# Patient Record
Sex: FEMALE | Race: WHITE | NOT HISPANIC OR LATINO | Employment: FULL TIME | URBAN - METROPOLITAN AREA
[De-identification: names, ages, dates, MRNs, and addresses within clinical notes are randomized per-mention and may not be internally consistent; named-entity substitution may affect disease eponyms.]

---

## 2017-01-18 ENCOUNTER — OFFICE VISIT (OUTPATIENT)
Dept: FAMILY MEDICINE | Facility: CLINIC | Age: 22
End: 2017-01-18
Payer: COMMERCIAL

## 2017-01-18 VITALS
HEART RATE: 64 BPM | RESPIRATION RATE: 20 BRPM | OXYGEN SATURATION: 98 % | BODY MASS INDEX: 21.47 KG/M2 | TEMPERATURE: 97.5 F | WEIGHT: 150 LBS | DIASTOLIC BLOOD PRESSURE: 70 MMHG | SYSTOLIC BLOOD PRESSURE: 110 MMHG | HEIGHT: 70 IN

## 2017-01-18 DIAGNOSIS — R07.0 THROAT PAIN: Primary | ICD-10-CM

## 2017-01-18 LAB
DEPRECATED S PYO AG THROAT QL EIA: NORMAL
MICRO REPORT STATUS: NORMAL
SPECIMEN SOURCE: NORMAL

## 2017-01-18 PROCEDURE — 99212 OFFICE O/P EST SF 10 MIN: CPT | Performed by: INTERNAL MEDICINE

## 2017-01-18 PROCEDURE — 87081 CULTURE SCREEN ONLY: CPT | Performed by: INTERNAL MEDICINE

## 2017-01-18 PROCEDURE — 87880 STREP A ASSAY W/OPTIC: CPT | Performed by: INTERNAL MEDICINE

## 2017-01-18 NOTE — PROGRESS NOTES
"  SUBJECTIVE:                                                    Tiffany Munoz is a 21 year old female who presents to clinic today for the following health issues:    RESPIRATORY SYMPTOMS      Duration: 01/16/17    Description  nasal congestion,sore throat, facial pain/pressure, cough, fever, chills, ear pain both, headache, fatigue/malaise and conjunctival irritation    Severity: moderate    Accompanying signs and symptoms: None    History (predisposing factors):  none    Precipitating or alleviating factors: None    Therapies tried and outcome:  Rest, Fluids and OTC NSAID            Works with kids; several with strep.              Temp 99+ this AM.      Problem list and histories reviewed & adjusted, as indicated.  Additional history: as documented    No current outpatient prescriptions on file.     Allergies   Allergen Reactions     Dust Mites      BP Readings from Last 3 Encounters:   01/18/17 110/70   12/14/16 102/60   08/23/16 100/50    Wt Readings from Last 3 Encounters:   01/18/17 150 lb (68.04 kg)   12/14/16 156 lb (70.761 kg)   08/23/16 143 lb (64.864 kg)                    ROS: See above      OBJECTIVE:                                                    /70 mmHg  Pulse 64  Temp(Src) 97.5  F (36.4  C) (Tympanic)  Resp 20  Ht 5' 10\" (1.778 m)  Wt 150 lb (68.04 kg)  BMI 21.52 kg/m2  SpO2 98%  LMP  (LMP Unknown)  Breastfeeding? No  Body mass index is 21.52 kg/(m^2).  GENERAL APPEARANCE: alert and no distress  HENT: Minimal erythema posterior oropharynx.  Ear canals and TMs are normal. No sinus tenderness.  NECK: cervical adenopathy , quite minimal    Diagnostic test results:  Results for orders placed or performed in visit on 01/18/17 (from the past 24 hour(s))   Strep, Rapid Screen   Result Value Ref Range    Specimen Description Throat     Rapid Strep A Screen       NEGATIVE: No Group A streptococcal antigen detected by immunoassay, await   culture report.      Micro Report Status " FINAL 01/18/2017         ASSESSMENT/PLAN:                                                        ICD-10-CM    1. Throat pain R07.0 Strep, Rapid Screen       Symptoms are consistent with a viral syndrome. Supportive therapy discussed.  Follow up with Provider - margaret Mendoza MD  [facility]

## 2017-01-18 NOTE — NURSING NOTE
"Chief Complaint   Patient presents with     URI     /70 mmHg  Pulse 64  Temp(Src) 97.5  F (36.4  C) (Tympanic)  Resp 20  Ht 5' 10\" (1.778 m)  Wt 150 lb (68.04 kg)  BMI 21.52 kg/m2  SpO2 98%  LMP  (LMP Unknown)  Breastfeeding? No Estimated body mass index is 21.52 kg/(m^2) as calculated from the following:    Height as of this encounter: 5' 10\" (1.778 m).    Weight as of this encounter: 150 lb (68.04 kg).  BP completed using cuff size: denton Herrera CMA    Health Maintenance Due   Topic Date Due     HPV IMMUNIZATION (2 of 3 - Female 3 Dose Series) 06/27/2007     NGA QUESTIONNAIRE 1 MONTH  06/18/2016     INFLUENZA VACCINE (SYSTEM ASSIGNED)  09/01/2016     Health Maintenance reviewed at today's visit patient asked to schedule/complete:   Immunizations:  Patient agrees to schedule      "

## 2017-01-20 LAB
BACTERIA SPEC CULT: NORMAL
MICRO REPORT STATUS: NORMAL
SPECIMEN SOURCE: NORMAL

## 2017-02-28 ENCOUNTER — TELEPHONE (OUTPATIENT)
Dept: FAMILY MEDICINE | Facility: CLINIC | Age: 22
End: 2017-02-28

## 2017-02-28 ENCOUNTER — OFFICE VISIT (OUTPATIENT)
Dept: FAMILY MEDICINE | Facility: CLINIC | Age: 22
End: 2017-02-28
Payer: COMMERCIAL

## 2017-02-28 ENCOUNTER — RADIANT APPOINTMENT (OUTPATIENT)
Dept: GENERAL RADIOLOGY | Facility: CLINIC | Age: 22
End: 2017-02-28
Attending: PHYSICIAN ASSISTANT
Payer: COMMERCIAL

## 2017-02-28 VITALS
SYSTOLIC BLOOD PRESSURE: 118 MMHG | DIASTOLIC BLOOD PRESSURE: 68 MMHG | HEIGHT: 70 IN | WEIGHT: 154 LBS | BODY MASS INDEX: 22.05 KG/M2 | OXYGEN SATURATION: 96 % | TEMPERATURE: 99 F | HEART RATE: 64 BPM | RESPIRATION RATE: 14 BRPM

## 2017-02-28 DIAGNOSIS — M25.561 PAIN OF RIGHT PATELLA: ICD-10-CM

## 2017-02-28 DIAGNOSIS — S89.91XA RIGHT KNEE INJURY, INITIAL ENCOUNTER: Primary | ICD-10-CM

## 2017-02-28 DIAGNOSIS — S89.91XA RIGHT KNEE INJURY, INITIAL ENCOUNTER: ICD-10-CM

## 2017-02-28 PROCEDURE — 73562 X-RAY EXAM OF KNEE 3: CPT | Mod: RT

## 2017-02-28 PROCEDURE — 99213 OFFICE O/P EST LOW 20 MIN: CPT | Performed by: PHYSICIAN ASSISTANT

## 2017-02-28 ASSESSMENT — ANXIETY QUESTIONNAIRES
3. WORRYING TOO MUCH ABOUT DIFFERENT THINGS: NEARLY EVERY DAY
6. BECOMING EASILY ANNOYED OR IRRITABLE: MORE THAN HALF THE DAYS
GAD7 TOTAL SCORE: 18
IF YOU CHECKED OFF ANY PROBLEMS ON THIS QUESTIONNAIRE, HOW DIFFICULT HAVE THESE PROBLEMS MADE IT FOR YOU TO DO YOUR WORK, TAKE CARE OF THINGS AT HOME, OR GET ALONG WITH OTHER PEOPLE: VERY DIFFICULT
1. FEELING NERVOUS, ANXIOUS, OR ON EDGE: NEARLY EVERY DAY
5. BEING SO RESTLESS THAT IT IS HARD TO SIT STILL: MORE THAN HALF THE DAYS
7. FEELING AFRAID AS IF SOMETHING AWFUL MIGHT HAPPEN: NEARLY EVERY DAY
2. NOT BEING ABLE TO STOP OR CONTROL WORRYING: MORE THAN HALF THE DAYS

## 2017-02-28 ASSESSMENT — PATIENT HEALTH QUESTIONNAIRE - PHQ9: 5. POOR APPETITE OR OVEREATING: NEARLY EVERY DAY

## 2017-02-28 NOTE — MR AVS SNAPSHOT
"              After Visit Summary   2/28/2017    Tiffany Munoz    MRN: [id number]           Patient Information     Date Of Birth          1995        Visit Information        Provider Department      2/28/2017 1:50 PM Siegler, Nicole Joy, PA-C [facility]        Today's Diagnoses     Right knee injury, initial encounter    -  1    Pain of right patella           Follow-ups after your visit        Who to contact     If you have questions or need follow up information about today's clinic visit or your schedule please contact [facility] directly at [phone].  Normal or non-critical lab and imaging results will be communicated to you by PneumRxhart, letter or phone within 4 business days after the clinic has received the results. If you do not hear from us within 7 days, please contact the clinic through PneumRxhart or phone. If you have a critical or abnormal lab result, we will notify you by phone as soon as possible.  Submit refill requests through Silenseed or call your pharmacy and they will forward the refill request to us. Please allow 3 business days for your refill to be completed.          Additional Information About Your Visit        MyChart Information     Silenseed gives you secure access to your electronic health record. If you see a primary care provider, you can also send messages to your care team and make appointments. If you have questions, please call your primary care clinic.  If you do not have a primary care provider, please call [phone] and they will assist you.        Care EveryWhere ID     This is your Care EveryWhere ID. This could be used by other organizations to access your [facility] medical records  [id number]        Your Vitals Were     Pulse Temperature Respirations Height Pulse Oximetry Breastfeeding?    64 99  F (37.2  C) 14 5' 10\" (1.778 m) 96% No    BMI (Body Mass Index)                   22.1 kg/m2            Blood " Pressure from Last 3 Encounters:   02/28/17 118/68   01/18/17 110/70   12/14/16 102/60    Weight from Last 3 Encounters:   02/28/17 154 lb (69.9 kg)   01/18/17 150 lb (68 kg)   12/14/16 156 lb (70.8 kg)               Primary Care Provider Office Phone # Fax #    Nicole Joy Siegler, PA-C [phone]       [facility] [address] [address]  [facility] [address]        Thank you!     Thank you for choosing [facility]  for your care. Our goal is always to provide you with excellent care. Hearing back from our patients is one way we can continue to improve our services. Please take a few minutes to complete the written survey that you may receive in the mail after your visit with us. Thank you!             Your Updated Medication List - Protect others around you: Learn how to safely use, store and throw away your medicines at www.disposemymeds.org.      Notice  As of 2/28/2017 11:59 PM    You have not been prescribed any medications.

## 2017-02-28 NOTE — PROGRESS NOTES
SUBJECTIVE:                                                    Tiffany Munoz is a 21 year old female who presents to clinic today for the following health issues:    Musculoskeletal problem/pain      Duration: Fell on Saturday with residual right knee pain    Description  Location: Right Knee    Intensity:  7/10    Accompanying signs and symptoms: swelling and discoloration of knee    History  Previous similar problem: no   Previous evaluation:  none    Precipitating or alleviating factors:  Trauma or overuse: YES  Aggravating factors include: standing, walking and driving    Therapies tried and outcome: ice and NSAID -      As above; Tiffany fell on Saturday while outside and landed on the right knee and knee cap; she feels continued pain in that area and noted swelling with some bruising as well. She has pain with bending the knee more so than standing straight up but has pain with standing as well. No numbness or tingling, no calf pain or swelling. No previous hx of fracture or dislocation of the right knee.     Problem list and histories reviewed & adjusted, as indicated.  Additional history: as documented    Patient Active Problem List   Diagnosis     Malaise and fatigue     Contraception     Mild major depression (H)     Anxiety     Menorrhagia     History reviewed. No pertinent past surgical history.    Social History   Substance Use Topics     Smoking status: Never Smoker     Smokeless tobacco: Never Used     Alcohol use No     Family History   Problem Relation Age of Onset     CEREBROVASCULAR DISEASE Maternal Grandmother 70     Unknown/Adopted Mother      Unknown/Adopted Father      CANCER No family hx of      HEART DISEASE No family hx of      DIABETES No family hx of      Coronary Artery Disease No family hx of      Hypertension No family hx of      Hyperlipidemia No family hx of      Breast Cancer No family hx of      Colon Cancer No family hx of      Prostate Cancer No family hx of      Other  "Cancer No family hx of      Depression No family hx of      Anxiety Disorder No family hx of      MENTAL ILLNESS No family hx of      Substance Abuse No family hx of      Anesthesia Reaction No family hx of      Asthma No family hx of      OSTEOPOROSIS No family hx of      Genetic Disorder No family hx of      Thyroid Disease No family hx of      Obesity No family hx of          No current outpatient prescriptions on file.       Reviewed and updated as needed this visit by clinical staff  Tobacco  Allergies  Meds  Med Hx  Surg Hx  Fam Hx  Soc Hx      Reviewed and updated as needed this visit by Provider         ROS:  Constitutional, HEENT, cardiovascular, pulmonary, gi and gu systems are negative, except as otherwise noted.    OBJECTIVE:                                                    /68 (BP Location: Left arm, Patient Position: Chair, Cuff Size: Adult Regular)  Pulse 64  Temp 99  F (37.2  C)  Resp 14  Ht 5' 10\" (1.778 m)  Wt 154 lb (69.9 kg)  SpO2 96%  Breastfeeding? No  BMI 22.1 kg/m2  Body mass index is 22.1 kg/(m^2).  GENERAL APPEARANCE: healthy, alert and no distress  ORTHO: Ankle Exam:   Knee: tender to palpation over the proximal tibia and patella and patellar tendon with bruising and mild swelling over that area. No medial or lateral joint line tenderness. No posterior tenderness or distal femur tenderness. Fibular head is non tender. Full ROM of the knee with extension and flexion equally; strength is full. Stable to varus/valgus, anterior/posterior drawer, lachman testing.   Lower leg:normal appearance, normal on palpation, normal gastroc-soleus muscle complex  NEURO: SILT over   VASC digits warm and well perfused    Diagnostic Test Results:  Xray right knee 3 views- negative for fracture or acute injury per my read; pending radiology     ASSESSMENT/PLAN:                                                        ICD-10-CM    1. Right knee injury, initial encounter S89.91XA XR Knee Right 3 " Views   2. Pain of right patella M25.561 XR Knee Right 3 Views     Negative exam and xray for fracture or injury requiring intervention at this time.   Ice, elevate, compress and reduce use for 1-2 weeks; return if not improving as anticipated     Nicole Joy Siegler, PA-C  [facility]

## 2017-02-28 NOTE — NURSING NOTE
"Chief Complaint   Patient presents with     Knee Injury     Right knee, fell on ice Saturday. edema and bruising.       Initial There were no vitals taken for this visit. Estimated body mass index is 21.52 kg/(m^2) as calculated from the following:    Height as of 1/18/17: 5' 10\" (1.778 m).    Weight as of 1/18/17: 150 lb (68 kg).  Medication Reconciliation: complete     Sheree Wright LPN  "

## 2017-02-28 NOTE — TELEPHONE ENCOUNTER
Tiffany Munoz is a 21 year old female who calls with knee bruise and pain.    NURSING ASSESSMENT:  Description: blue/yellow bruise on one knee and a scrape, hurts to walk and put weight on it - but able to walk, no deformity, some mild swelling, painful to bend knee and pain in the back of the knee  Onset/duration: 3 days   Precip. factors:  Patient fell on ice knee down  Associated symptoms: no numbness and no tingling, some tingling on the first day only.   Improves/worsens symptoms: Ice helps   Pain scale (0-10)   8/10  LMP/preg/breast feeding:  NA   Last exam/Treatment:  1/18/17   Allergies:   Allergies   Allergen Reactions     Dust Mites        MEDICATIONS:   Taking medication(s) as prescribed? N/A  Taking over the counter medication(s?) N/A  Any medication side effects? No significant side effects    Any barriers to taking medication(s) as prescribed?  N/A  Medication(s) improving/managing symptoms?  N/A  Medication reconciliation completed: N/A      NURSING PLAN: Nursing advice to patient given     Rest, Ice, & Elevate until seen.     RECOMMENDED DISPOSITION:  See in 4 hours, another person to drive - Yes Appt scheduled for today with PCP.   Will comply with recommendation: Yes  If further questions/concerns or if symptoms do not improve, worsen or new symptoms develop, call your PCP or [facility] as soon as possible.      Guideline used:  Telephone Triage Protocols for Nurses, Fourth Edition, Whit Stiles RN

## 2017-03-01 ASSESSMENT — ANXIETY QUESTIONNAIRES: GAD7 TOTAL SCORE: 18

## 2017-03-06 ENCOUNTER — OFFICE VISIT (OUTPATIENT)
Dept: FAMILY MEDICINE | Facility: CLINIC | Age: 22
End: 2017-03-06
Payer: COMMERCIAL

## 2017-03-06 VITALS
RESPIRATION RATE: 16 BRPM | TEMPERATURE: 99.6 F | SYSTOLIC BLOOD PRESSURE: 110 MMHG | DIASTOLIC BLOOD PRESSURE: 68 MMHG | WEIGHT: 152 LBS | HEART RATE: 94 BPM | BODY MASS INDEX: 21.76 KG/M2 | HEIGHT: 70 IN | OXYGEN SATURATION: 97 %

## 2017-03-06 DIAGNOSIS — R07.0 THROAT PAIN: Primary | ICD-10-CM

## 2017-03-06 LAB
DEPRECATED S PYO AG THROAT QL EIA: ABNORMAL
MICRO REPORT STATUS: ABNORMAL
SPECIMEN SOURCE: ABNORMAL

## 2017-03-06 PROCEDURE — 87880 STREP A ASSAY W/OPTIC: CPT | Performed by: FAMILY MEDICINE

## 2017-03-06 PROCEDURE — 99213 OFFICE O/P EST LOW 20 MIN: CPT | Performed by: FAMILY MEDICINE

## 2017-03-06 RX ORDER — AMOXICILLIN 500 MG/1
500 CAPSULE ORAL 3 TIMES DAILY
Qty: 30 CAPSULE | Refills: 0 | Status: SHIPPED | OUTPATIENT
Start: 2017-03-06 | End: 2017-04-10

## 2017-03-06 NOTE — NURSING NOTE
"Chief Complaint   Patient presents with     URI       Initial /68  Pulse 94  Temp 99.6  F (37.6  C) (Tympanic)  Resp 16  Ht 5' 10\" (1.778 m)  Wt 152 lb (68.9 kg)  SpO2 97%  BMI 21.81 kg/m2 Estimated body mass index is 21.81 kg/(m^2) as calculated from the following:    Height as of this encounter: 5' 10\" (1.778 m).    Weight as of this encounter: 152 lb (68.9 kg).  Medication Reconciliation: complete   .Keely BANKS      "

## 2017-03-06 NOTE — MR AVS SNAPSHOT
After Visit Summary   3/6/2017    Tiffany Munoz    MRN: [id number]           Patient Information     Date Of Birth          1995        Visit Information        Provider Department      3/6/2017 2:00 PM Andrew River MD [facility]        Today's Diagnoses     Throat pain    -  1      Care Instructions    Symptomatic treatment.  Will use saline gargles, tylenol and/or advil. Suck on  lozenges as needed. Push fluids. Salt water nasal spray as needed.        Follow-ups after your visit        Follow-up notes from your care team     Return if symptoms worsen or fail to improve.      Who to contact     If you have questions or need follow up information about today's clinic visit or your schedule please contact [facility] directly at [phone].  Normal or non-critical lab and imaging results will be communicated to you by Nethra Imaginghart, letter or phone within 4 business days after the clinic has received the results. If you do not hear from us within 7 days, please contact the clinic through Nethra Imaginghart or phone. If you have a critical or abnormal lab result, we will notify you by phone as soon as possible.  Submit refill requests through [employer] or call your pharmacy and they will forward the refill request to us. Please allow 3 business days for your refill to be completed.          Additional Information About Your Visit        MyChart Information     [employer]ity gives you secure access to your electronic health record. If you see a primary care provider, you can also send messages to your care team and make appointments. If you have questions, please call your primary care clinic.  If you do not have a primary care provider, please call [phone] and they will assist you.        Care EveryWhere ID     This is your Care EveryWhere ID. This could be used by other organizations to access your [facility] medical records  [id number]        Your  "Vitals Were     Pulse Temperature Respirations Height Pulse Oximetry BMI (Body Mass Index)    94 99.6  F (37.6  C) (Tympanic) 16 5' 10\" (1.778 m) 97% 21.81 kg/m2       Blood Pressure from Last 3 Encounters:   03/06/17 110/68   02/28/17 118/68   01/18/17 110/70    Weight from Last 3 Encounters:   03/06/17 152 lb (68.9 kg)   02/28/17 154 lb (69.9 kg)   01/18/17 150 lb (68 kg)              We Performed the Following     Rapid strep screen          Today's Medication Changes          These changes are accurate as of: 3/6/17  2:54 PM.  If you have any questions, ask your nurse or doctor.               Start taking these medicines.        Dose/Directions    amoxicillin 500 MG capsule   Commonly known as:  AMOXIL   Used for:  Throat pain   Started by:  Andrew River MD        Dose:  500 mg   Take 1 capsule (500 mg) by mouth 3 times daily   Quantity:  30 capsule   Refills:  0            Where to get your medicines      These medications were sent to [id number] - [facility] [address]  [address] [facility] [address]     Phone:  [phone]     amoxicillin 500 MG capsule                Primary Care Provider Office Phone # Fax #    Nicole Joy Siegler, PA-C [phone]       [facility] [address] [address]  [place] [address]        Thank you!     Thank you for choosing [facility]  for your care. Our goal is always to provide you with excellent care. Hearing back from our patients is one way we can continue to improve our services. Please take a few minutes to complete the written survey that you may receive in the mail after your visit with us. Thank you!             Your Updated Medication List - Protect others around you: Learn how to safely use, store and throw away your medicines at www.disposemymeds.org.          This list is accurate as of: 3/6/17  2:54 PM.  Always use your most recent med list.                   Brand " Name Dispense Instructions for use    amoxicillin 500 MG capsule    AMOXIL    30 capsule    Take 1 capsule (500 mg) by mouth 3 times daily

## 2017-03-06 NOTE — LETTER
[facility]  [address]  [address]  [place] [address]  [phone]                                                                                                           Tiffany Munoz  [facility] [facility]   [place] [address]    March 6, 2017      To Whom it Concerns     Tiffany SIDNEY Munoz, was seen today with positive Strep throat test.  She should be home from school/work thru Tuesday 3/7/17                Sincerely,    Andrew River MD

## 2017-03-16 ENCOUNTER — HOSPITAL ENCOUNTER (EMERGENCY)
Facility: CLINIC | Age: 22
Discharge: HOME | End: 2017-03-16
Attending: PHYSICIAN ASSISTANT | Admitting: PHYSICIAN ASSISTANT
Payer: COMMERCIAL

## 2017-03-16 ENCOUNTER — APPOINTMENT (OUTPATIENT)
Dept: ULTRASOUND IMAGING | Facility: CLINIC | Age: 22
End: 2017-03-16
Attending: PHYSICIAN ASSISTANT
Payer: COMMERCIAL

## 2017-03-16 VITALS
BODY MASS INDEX: 21.47 KG/M2 | SYSTOLIC BLOOD PRESSURE: 124 MMHG | OXYGEN SATURATION: 99 % | RESPIRATION RATE: 16 BRPM | TEMPERATURE: 98.4 F | HEIGHT: 70 IN | WEIGHT: 150 LBS | DIASTOLIC BLOOD PRESSURE: 89 MMHG

## 2017-03-16 DIAGNOSIS — R10.32 ABDOMINAL PAIN, LEFT LOWER QUADRANT: ICD-10-CM

## 2017-03-16 LAB
ALBUMIN SERPL-MCNC: 4 G/DL (ref 3.4–5)
ALBUMIN UR-MCNC: NEGATIVE MG/DL
ALP SERPL-CCNC: 88 U/L (ref 40–150)
ALT SERPL W P-5'-P-CCNC: 19 U/L (ref 0–50)
ANION GAP SERPL CALCULATED.3IONS-SCNC: 9 MMOL/L (ref 3–14)
APPEARANCE UR: CLEAR
AST SERPL W P-5'-P-CCNC: 23 U/L (ref 0–45)
BASOPHILS # BLD AUTO: 0 10E9/L (ref 0–0.2)
BASOPHILS NFR BLD AUTO: 0.2 %
BILIRUB SERPL-MCNC: 0.4 MG/DL (ref 0.2–1.3)
BILIRUB UR QL STRIP: NEGATIVE
BUN SERPL-MCNC: 11 MG/DL (ref 7–30)
CALCIUM SERPL-MCNC: 8.7 MG/DL (ref 8.5–10.1)
CHLORIDE SERPL-SCNC: 105 MMOL/L (ref 94–109)
CO2 SERPL-SCNC: 25 MMOL/L (ref 20–32)
COLOR UR AUTO: YELLOW
CREAT SERPL-MCNC: 0.66 MG/DL (ref 0.52–1.04)
DIFFERENTIAL METHOD BLD: NORMAL
EOSINOPHIL # BLD AUTO: 0.1 10E9/L (ref 0–0.7)
EOSINOPHIL NFR BLD AUTO: 0.7 %
ERYTHROCYTE [DISTWIDTH] IN BLOOD BY AUTOMATED COUNT: 12.7 % (ref 10–15)
GFR SERPL CREATININE-BSD FRML MDRD: NORMAL ML/MIN/1.7M2
GLUCOSE SERPL-MCNC: 90 MG/DL (ref 70–99)
GLUCOSE UR STRIP-MCNC: NEGATIVE MG/DL
HCG SERPL QL: NEGATIVE
HCT VFR BLD AUTO: 40.9 % (ref 35–47)
HGB BLD-MCNC: 13.9 G/DL (ref 11.7–15.7)
HGB UR QL STRIP: ABNORMAL
IMM GRANULOCYTES # BLD: 0 10E9/L (ref 0–0.4)
IMM GRANULOCYTES NFR BLD: 0.2 %
KETONES UR STRIP-MCNC: NEGATIVE MG/DL
LEUKOCYTE ESTERASE UR QL STRIP: NEGATIVE
LIPASE SERPL-CCNC: 138 U/L (ref 73–393)
LYMPHOCYTES # BLD AUTO: 2.2 10E9/L (ref 0.8–5.3)
LYMPHOCYTES NFR BLD AUTO: 25.2 %
MCH RBC QN AUTO: 32.6 PG (ref 26.5–33)
MCHC RBC AUTO-ENTMCNC: 34 G/DL (ref 31.5–36.5)
MCV RBC AUTO: 96 FL (ref 78–100)
MONOCYTES # BLD AUTO: 0.8 10E9/L (ref 0–1.3)
MONOCYTES NFR BLD AUTO: 9.7 %
NEUTROPHILS # BLD AUTO: 5.5 10E9/L (ref 1.6–8.3)
NEUTROPHILS NFR BLD AUTO: 64 %
NITRATE UR QL: NEGATIVE
NON-SQ EPI CELLS #/AREA URNS LPF: NORMAL /LPF
NRBC # BLD AUTO: 0 10*3/UL
NRBC BLD AUTO-RTO: 0 /100
PH UR STRIP: 5 PH (ref 5–7)
PLATELET # BLD AUTO: 216 10E9/L (ref 150–450)
POTASSIUM SERPL-SCNC: 3.6 MMOL/L (ref 3.4–5.3)
PROT SERPL-MCNC: 7.6 G/DL (ref 6.8–8.8)
RBC # BLD AUTO: 4.26 10E12/L (ref 3.8–5.2)
RBC #/AREA URNS AUTO: NORMAL /HPF (ref 0–2)
SODIUM SERPL-SCNC: 139 MMOL/L (ref 133–144)
SP GR UR STRIP: 1.01 (ref 1–1.03)
URN SPEC COLLECT METH UR: ABNORMAL
UROBILINOGEN UR STRIP-ACNC: 0.2 EU/DL (ref 0.2–1)
WBC # BLD AUTO: 8.5 10E9/L (ref 4–11)
WBC #/AREA URNS AUTO: NORMAL /HPF (ref 0–2)

## 2017-03-16 PROCEDURE — 84703 CHORIONIC GONADOTROPIN ASSAY: CPT | Performed by: EMERGENCY MEDICINE

## 2017-03-16 PROCEDURE — 99284 EMERGENCY DEPT VISIT MOD MDM: CPT | Mod: 25

## 2017-03-16 PROCEDURE — 80053 COMPREHEN METABOLIC PANEL: CPT | Performed by: EMERGENCY MEDICINE

## 2017-03-16 PROCEDURE — 81001 URINALYSIS AUTO W/SCOPE: CPT | Performed by: EMERGENCY MEDICINE

## 2017-03-16 PROCEDURE — 85025 COMPLETE CBC W/AUTO DIFF WBC: CPT | Performed by: EMERGENCY MEDICINE

## 2017-03-16 PROCEDURE — 83690 ASSAY OF LIPASE: CPT | Performed by: EMERGENCY MEDICINE

## 2017-03-16 PROCEDURE — 93976 VASCULAR STUDY: CPT | Mod: XS

## 2017-03-16 ASSESSMENT — ENCOUNTER SYMPTOMS
NAUSEA: 0
VOMITING: 0
ABDOMINAL PAIN: 1

## 2017-03-16 NOTE — ED AVS SNAPSHOT
Emergency Department    [address] [place] [address]    Phone:  [phone]    Fax:  [phone]                                       Tiffany Munoz   MRN: [id number]    Department:   Emergency Department   Date of Visit:  3/16/2017           After Visit Summary Signature Page     I have received my discharge instructions, and my questions have been answered. I have discussed any challenges I see with this plan with the nurse or doctor.    ..........................................................................................................................................  Patient/Patient Representative Signature      ..........................................................................................................................................  Patient Representative Print Name and Relationship to Patient    ..................................................               ................................................  Date                                            Time    ..........................................................................................................................................  Reviewed by Signature/Title    ...................................................              ..............................................  Date                                                            Time

## 2017-03-16 NOTE — ED AVS SNAPSHOT
Emergency Department    [address] [facility] [address]    Phone:  [phone]    Fax:  [phone]                                       Tiffany Munoz   MRN: [id number]    Department:   Emergency Department   Date of Visit:  3/16/2017           Patient Information     Date Of Birth          1995        Your diagnoses for this visit were:     Abdominal pain, left lower quadrant        You were seen by Teri Chambers PA-C.      Follow-up Information     Follow up with Siegler, Nicole Joy, PA-C.    Specialty:  Physician Assistant    Contact information:    [facility]  [address]  [facility] [address] [phone]          Follow up with  Emergency Department.    Specialty:  EMERGENCY MEDICINE    Why:  If symptoms worsen    Contact information:    [address] [facility] [id number] [phone]        Discharge Instructions       Discharge Instructions  Abdominal Pain    Abdominal pain can be caused by many things. Your evaluation today does not show the exact cause for your pain. Your doctor today has decided that it is unlikely your pain is due to a life threatening problem, or a problem requiring surgery or hospital admission. Sometimes those problems cannot be found right away, so it is very important that you follow up as directed.  Sometimes only the changes which occur over time allow the cause of your pain to be found.    Return to the Emergency Department for a recheck in 8-12 hours if your pain continues.  If your pain gets worse, changes in location, or feels different, return to the Emergency Department right away.    ADULTS:  Return to the Emergency Department right away if:      You get an oral temperature above 102oF or as directed by your doctor.    You have blood in your stools (bright red or black, tarry stools).    You keep throwing up or can t drink liquids.    You see blood when you throw up.    You can t have a bowel  movement or you can t pass gas.    Your stomach gets bloated or bigger.    Your skin or the whites of your eyes look yellow.    You faint.    You have bloody, frequent or painful urination.    You have new symptoms or anything that worries you.    CHILDREN:  Return to the Emergency Department right away if your child has any of the above-listed symptoms or the following:      Pushes your hand away or screams/cries when his/her belly is touched.    You notice your child is very fussy or weak.    Your child is very tired and is too tired to eat or drink.    Your child is dehydrated.  Signs of dehydration can be:  o Your infant has had no wet diapers in 4-5 hours.  o Your older child has not passed urine in 6-8 hours.  o Your infant or child starts to have dry mouth and lips, or no saliva or tears.    PREGNANT WOMEN:  Return to the Emergency Department right away if you have any of the above-listed symptoms or the following:      You have bleeding, leaking fluid or passing tissue from the vagina.    You have worse pain or cramping, or pain in your shoulder or back.    You have vomiting that will not stop.    You have painful or bloody urination.    You have a temperature of 100oF or more.    Your baby is not moving as much as usual.    You faint.    You get a bad headache with or without eye problems and abdominal pain.    You have a convulsion or seizure.    You have unusual discharge from your vagina and abdominal pain.    Abdominal pain is pretty common during pregnancy.  Your pain may or may not be related to your pregnancy. You should follow-up closely with your OB doctor so they can evaluate you and your baby.  Until you follow-up with your regular doctor, do the following:       Avoid sex and do not put anything in your vagina.    Drink clear fluids.    Only take medications approved by your doctor.    MORE INFORMATION:    Appendicitis:  A possible cause of abdominal pain in any person who still has their  "appendix is acute appendicitis. Appendicitis is often hard to diagnose.  Testing does not always rule out early appendicitis or other causes of abdominal pain. Close follow-up with your doctor and re-evaluations may be needed to figure out the reason for your abdominal pain.    Follow-up:  It is very important that you make an appointment with your clinic and go to the appointment.  If you do not follow-up with your primary doctor, it may result in missing an important development which could result in permanent injury or disability and/or lasting pain.  If there is any problem keeping your appointment, call your doctor or return to the Emergency Department.    Medications:  Take your medications as directed by your doctor today.  Before using over-the-counter medications, ask your doctor and make sure to take the medications as directed.  If you have any questions about medications, ask your doctor.    Diet:  Resume your normal diet as much as possible, but do not eat fried, fatty or spicy foods while you have pain.  Do not drink alcohol or have caffeine.  Do not smoke tobacco.    Probiotics: If you have been given an antibiotic, you may want to also take a probiotic pill or eat yogurt with live cultures. Probiotics have \"good bacteria\" to help your intestines stay healthy. Studies have shown that probiotics help prevent diarrhea and other intestine problems (including C. diff infection) when you take antibiotics. You can buy these without a prescription in the pharmacy section of the store.     If you were given a prescription for medicine here today, be sure to read all of the information (including the package insert) that comes with your prescription.  This will include important information about the medicine, its side effects, and any warnings that you need to know about.  The pharmacist who fills the prescription can provide more information and answer questions you may have about the medicine.  If you have " questions or concerns that the pharmacist cannot address, please call or return to the Emergency Department.     Discharge Instructions  Constipation  Your doctor has diagnosed you with constipation. Constipation can cause severe cramping pain and your physician thinks this is the cause of your abdominal pain today.  People usually recognize that they are constipated because they have difficulty having bowel movements, are not having bowel movements frequently enough, or are not having large enough bowel movements. Sometimes, especially in children or older people, you do not recognize that you are constipated until it becomes severe. The most common cause of constipation is a combination of lack of exercise and not eating enough fruits, vegetables and whole grains. Constipation can also be a side effect of medications, such as narcotics, or may be caused by a disease of the digestive system.    Return to the Emergency Department if:    Your abdominal pain worsens or does not improve after a bowel movement.    You become very weak.    You get an oral temperature above 102oF or as directed by your doctor.    You have blood in your stools (bright red or black, tarry stools).    You keep throwing up or can t drink liquids.    Your see blood when you throw up.    Your stomach gets bloated or bigger.    You have new symptoms or anything that worries you.    What can I do to help myself?    If your doctor gave you a cathartic medication, like magnesium citrate or GoLytely  (polyethylene glycol), you can expect to have cramps and gas pains after taking it. You can expect to have a number of bowel movements and even diarrhea in the course of clearing your bowels.  You will know your bowels have been cleaned out after you pass clear liquid. The cramps and gas should let up after you have emptied your bowels. You may want to wait until morning to take this type of medication so you aren t up in the night.     Sometimes  instead of cathartics, we recommend laxatives like milk of magnesia to move your bowels more slowly, or an enema to help the bowels to move. Read and follow the package directions, or follow your physician s instructions.    Once you have become very constipated, it takes time for your bowels to return to normal and you need to be very careful to prevent becoming constipated again. Take a laxative if you don t move your bowels at least every two days.       Eat foods that have a lot of fiber. Good choices are fruits, vegetables, prune juice, apple juice and high fiber cereal. Limit dairy products such as milk and cheese, since these can make constipation worse.     Drink plenty of water and other fluids.     When you feel the need to go to the bathroom, go to the bathroom. Don t hold it.    Miralax , Metamucil , Colace , Senna or fiber supplements can be used daily.  Miralax  daily is often the best choice for children.    FOLLOW UP WITH YOUR REGULAR DOCTOR IF YOUR CONSTIPATION IS NOT IMPROVING.  Sometimes, chronic constipation requires further testing to determine the cause. If you are over 50 years old, you may need a colonoscopy if you have not had one before.   If you were given a prescription for medicine here today, be sure to read all of the information (including the package insert) that comes with your prescription.  This will include important information about the medicine, its side effects, and any warnings that you need to know about.  The pharmacist who fills the prescription can provide more information and answer questions you may have about the medicine.  If you have questions or concerns that the pharmacist cannot address, please call or return to the Emergency Department.     Remember that you can always come back to the Emergency Department if you are not able to see your regular doctor in the amount of time listed above, if you get any new symptoms, or if there is anything that worries  you.              24 Hour Appointment Hotline       To make an appointment at any [facility], call [phone] ([phone]). If you don't have a family doctor or clinic, we will help you find one. [facility] clinics are conveniently located to serve the needs of you and your family.             Review of your medicines      Our records show that you are taking the medicines listed below. If these are incorrect, please call your family doctor or clinic.        Dose / Directions Last dose taken    amoxicillin 500 MG capsule   Commonly known as:  AMOXIL   Dose:  500 mg   Quantity:  30 capsule        Take 1 capsule (500 mg) by mouth 3 times daily   Refills:  0                Procedures and tests performed during your visit     *UA reflex to Microscopic (ED Lab POCT Only 3-11)    CBC with platelets + differential    Comprehensive metabolic panel    HCG qualitative    Lipase    US Pelvic Complete w Transvaginal & Abd/Pel Duplex Limited    Urine Microscopic      Orders Needing Specimen Collection     None      Pending Results     Date and Time Order Name Status Description    3/16/2017 1935 US Pelvic Complete w Transvaginal & Abd/Pel Duplex Limited Preliminary             Pending Culture Results     No orders found from 3/14/2017 to 3/17/2017.             Test Results from your hospital stay     3/16/2017  7:23 PM - Interface, Global Weather[employer] Results      Component Results     Component Value Ref Range & Units Status    WBC 8.5 4.0 - 11.0 10e9/L Final    RBC Count 4.26 3.8 - 5.2 10e12/L Final    Hemoglobin 13.9 11.7 - 15.7 g/dL Final    Hematocrit 40.9 35.0 - 47.0 % Final    MCV 96 78 - 100 fl Final    MCH 32.6 26.5 - 33.0 pg Final    MCHC 34.0 31.5 - 36.5 g/dL Final    RDW 12.7 10.0 - 15.0 % Final    Platelet Count 216 150 - 450 10e9/L Final    Diff Method Automated Method  Final    % Neutrophils 64.0 % Final    % Lymphocytes 25.2 % Final    % Monocytes 9.7 % Final    % Eosinophils 0.7 % Final    % Basophils 0.2 %  Final    % Immature Granulocytes 0.2 % Final    Nucleated RBCs 0 0 /100 Final    Absolute Neutrophil 5.5 1.6 - 8.3 10e9/L Final    Absolute Lymphocytes 2.2 0.8 - 5.3 10e9/L Final    Absolute Monocytes 0.8 0.0 - 1.3 10e9/L Final    Absolute Eosinophils 0.1 0.0 - 0.7 10e9/L Final    Absolute Basophils 0.0 0.0 - 0.2 10e9/L Final    Abs Immature Granulocytes 0.0 0 - 0.4 10e9/L Final    Absolute Nucleated RBC 0.0  Final         3/16/2017  7:40 PM - Interface, Flexilab Results      Component Results     Component Value Ref Range & Units Status    Sodium 139 133 - 144 mmol/L Final    Potassium 3.6 3.4 - 5.3 mmol/L Final    Chloride 105 94 - 109 mmol/L Final    Carbon Dioxide 25 20 - 32 mmol/L Final    Anion Gap 9 3 - 14 mmol/L Final    Glucose 90 70 - 99 mg/dL Final    Urea Nitrogen 11 7 - 30 mg/dL Final    Creatinine 0.66 0.52 - 1.04 mg/dL Final    GFR Estimate >90  Non  GFR Calc   >60 mL/min/1.7m2 Final    GFR Estimate If Black >90   GFR Calc   >60 mL/min/1.7m2 Final    Calcium 8.7 8.5 - 10.1 mg/dL Final    Bilirubin Total 0.4 0.2 - 1.3 mg/dL Final    Albumin 4.0 3.4 - 5.0 g/dL Final    Protein Total 7.6 6.8 - 8.8 g/dL Final    Alkaline Phosphatase 88 40 - 150 U/L Final    ALT 19 0 - 50 U/L Final    AST 23 0 - 45 U/L Final         3/16/2017  7:27 PM - Interface, Flexilab Results      Component Results     Component Value Ref Range & Units Status    Color Urine Yellow  Final    Appearance Urine Clear  Final    Glucose Urine Negative NEG mg/dL Final    Bilirubin Urine Negative NEG Final    Ketones Urine Negative NEG mg/dL Final    Specific Gravity Urine 1.010 1.003 - 1.035 Final    Blood Urine Moderate (A) NEG Final    pH Urine 5.0 5.0 - 7.0 pH Final    Protein Albumin Urine Negative NEG mg/dL Final    Urobilinogen Urine 0.2 0.2 - 1.0 EU/dL Final    Nitrite Urine Negative NEG Final    Leukocyte Esterase Urine Negative NEG Final    Source Midstream Urine  Final         3/16/2017  7:27 PM -  Interface, Flexilab Results      Component Results     Component Value Ref Range & Units Status    WBC Urine O - 2 0 - 2 /HPF Final    RBC Urine O - 2 0 - 2 /HPF Final    Squamous Epithelial /LPF Urine Few FEW /LPF Final         3/16/2017  8:46 PM - Interface, Radiant Ib      Narrative     US PELVIS COMPLETE WITH TRANSVAGINAL AND DOPPLER LIMITED 3/16/2017  8:36 PM     INDICATION: Pelvic pain.    COMPARISON: None.    FINDINGS: Transabdominal followed by endovaginal ultrasound to better  evaluate endometrium and ovaries. Uterus measures 5.6 x 2.5 x 3.8 cm.  There is an IUD in the central endometrium. The IUD may be slightly  low within the endometrium. The tip appears to be approximately 1 cm  from the fundal endometrium. No uterine masses.    Both ovaries are visualized and within normal limits. No suspicious  adnexal or ovarian masses. Doppler color and waveform analysis  demonstrates blood flow to both ovaries. No free fluid or fluid  collections.        Impression     IMPRESSION:  1. No acute findings in the pelvis.  2. The IUD position is possibly abnormally low in the endometrium.          3/16/2017  7:49 PM - Interface, Flexilab Results      Component Results     Component Value Ref Range & Units Status    Lipase 138 73 - 393 U/L Final         3/16/2017  7:59 PM - Interface, Flexilab Results      Component Results     Component Value Ref Range & Units Status    HCG Qualitative Serum Negative NEG Final                Clinical Quality Measure: Blood Pressure Screening     Your blood pressure was checked while you were in the emergency department today. The last reading we obtained was  BP: 124/89 . Please read the guidelines below about what these numbers mean and what you should do about them.  If your systolic blood pressure (the top number) is less than 120 and your diastolic blood pressure (the bottom number) is less than 80, then your blood pressure is normal. There is nothing more that you need to do about  it.  If your systolic blood pressure (the top number) is 120-139 or your diastolic blood pressure (the bottom number) is 80-89, your blood pressure may be higher than it should be. You should have your blood pressure rechecked within a year by a primary care provider.  If your systolic blood pressure (the top number) is 140 or greater or your diastolic blood pressure (the bottom number) is 90 or greater, you may have high blood pressure. High blood pressure is treatable, but if left untreated over time it can put you at risk for heart attack, stroke, or kidney failure. You should have your blood pressure rechecked by a primary care provider within the next 4 weeks.  If your provider in the emergency department today gave you specific instructions to follow-up with your doctor or provider even sooner than that, you should follow that instruction and not wait for up to 4 weeks for your follow-up visit.        Thank you for choosing [facility]       Thank you for choosing [facility] for your care. Our goal is always to provide you with excellent care. Hearing back from our patients is one way we can continue to improve our services. Please take a few minutes to complete the written survey that you may receive in the mail after you visit with us. Thank you!        [employer] Information     [employer] gives you secure access to your electronic health record. If you see a primary care provider, you can also send messages to your care team and make appointments. If you have questions, please call your primary care clinic.  If you do not have a primary care provider, please call [phone] and they will assist you.        Care EveryWhere ID     This is your Care EveryWhere ID. This could be used by other organizations to access your [facility] medical records  [id number]        After Visit Summary       This is your record. Keep this with you and show to your community pharmacist(s) and doctor(s) at your next visit.

## 2017-03-17 ENCOUNTER — TELEPHONE (OUTPATIENT)
Dept: NURSING | Facility: CLINIC | Age: 22
End: 2017-03-17

## 2017-03-17 ASSESSMENT — ENCOUNTER SYMPTOMS
BACK PAIN: 0
FLANK PAIN: 0
FREQUENCY: 0
DIARRHEA: 0
CONSTIPATION: 0
CHILLS: 0
DYSURIA: 0
FEVER: 0
HEMATURIA: 0
DIFFICULTY URINATING: 0

## 2017-03-17 NOTE — TELEPHONE ENCOUNTER
"Call Type: Triage Call    Presenting Problem: \"I was in the ER yesterday for my IUD pain, and  said I should make an a[ppointment to take it out.  It is still  painful, does the urgent care remove them?\"  Triage Note:  Guideline Title: Contraception: Intrauterine Device (IUD)  Recommended Disposition: See ED Immediately  Original Inclination: Wanted to speak with a nurse  Override Disposition:  Intended Action: Go to Hospital / ED  Physician Contacted: No  Unbearable abdominal/pelvic pain or cramping ?  YES  Sexually active AND ectopic pregnancy risk factors ? NO  New or worsening signs and symptoms that may indicate shock ? NO  Profuse vaginal bleeding not contained by pads or tampons ? NO  Heavy vaginal bleeding (soaking 1 pad/tampon every hour for 2 hours or more) ? NO  Physician Instructions:  Care Advice: Another adult should drive.  "

## 2017-03-17 NOTE — ED PROVIDER NOTES
"  History     Chief Complaint:  Abdominal pain    HPI   Tiffany Munoz is an otherwise healthy female 21 year old female who presents with abdominal pain. Two hours ago, the patient began experiencing intermittent sharp left sided abdominal pain. The pain is alleviated by lying down and exacerbated with ambulation. The patient states that she had the Mirena IUD placed a year and a half ago and recently began experiencing intermittent pain with intercourse and running. She has also been experiencing increased frequency of bowel movements. The pain does not radiate. No fevers, nausea or vomiting. No urinary or vaginal symptoms. She has not been doing any new activities or workouts. She denies a chance of STDs, though is sexually active.    Allergies:  No known drug allergies.    Medications:    No daily medications  Mirena IUD in place     Past Medical History:    Menorrhagia  Anxiety  Depression     Past Surgical History:    Orthopedic surgery    Family History:    History reviewed. No pertinent family history.    Social History:  Marital Status: single  Presents to the ED with her mother  Tobacco Use: negative  Alcohol Use: negative  PCP: Nicole Joy Siegler     Review of Systems   Constitutional: Negative for chills and fever.   Gastrointestinal: Positive for abdominal pain. Negative for constipation, diarrhea, nausea and vomiting.        Positive for increased frequency of bowel movements   Genitourinary: Negative for difficulty urinating, dysuria, flank pain, frequency, hematuria, pelvic pain, urgency, vaginal bleeding, vaginal discharge and vaginal pain.   Musculoskeletal: Negative for back pain.   All other systems reviewed and are negative.    Physical Exam     Patient Vitals for the past 24 hrs:   BP Temp Temp src Heart Rate Resp SpO2 Height Weight   03/16/17 1820 124/89 98.4  F (36.9  C) Temporal 76 16 99 % 1.778 m (5' 10\") 68 kg (150 lb)       Physical Exam  General: Resting comfortably on the gurney.  "   Head:  The scalp, head and face appear normal.   ENT:  Pupils are equal, round and reactive to light.     Oropharynx is moist.    Neck:  Supple, no rigidity noted. Normal ROM.     Trachea midline. No mass detected.    Resp:  Non-labored breathing. No tachypnea.     Lung fields clear to auscultation without wheezes or rales.   CV:  Regular rate and rhythm. Normal S1 and S2, no S3 or S4.     No pathological murmur detected.     Radial, DP and PT pulses intact and symmetric.   GI:  Abdomen is soft and non-distended.     Diffuse left sided tenderness with palpation, no rebound or guarding.     Abdomen otherwise nontender, no masses or organomegaly.     No CVA tenderness bilaterally.    MS:  Normal muscular tone.   Neuro: Awake and alert. Speech is clear.   Skin:  No rash or pallor.  Psych:  Normal affect. Appropriate interactions.       Emergency Department Course   Imaging:  Radiographic findings were communicated with the patient who voiced understanding of the findings.    US Pelvis Complete w Transvaginal & Abd/Pel Duplex Limited  1. No acute findings in the pelvis.  2. The IUD position is possibly abnormally low in the endometrium.   Preliminary radiology read.      Laboratory:  CBC:  WBC 8.5, HGB 13.9, , otherwise WNL  CMP: WNL (Creatinine 0.66)  Lipase: 138  HCG qualitative: negative    UA: Clear yellow urine, blood moderate, otherwise WNL     Emergency Department Course:  Nursing notes and vitals reviewed.  I performed an exam of the patient as documented above.   Blood was drawn from the patient. This was sent for laboratory testing, findings above.   Urine sample was obtained and sent for laboratory analysis, findings above.  (2050) I rechecked the patient.  The patient was sent for a pelvic ultrasound while in the emergency department, findings above.   Findings and plan explained to the patient. Patient discharged home with instructions regarding supportive care, medications, and reasons to return.  The importance of close follow-up was reviewed.     Impression & Plan    Medical Decision Making:  Tiffany is a 21 year old female who is otherwise healthy and presents to the emergency department with left sided abdominal pain. Concern was broad and for UTI, nephrolithiasis, ovarian cyst or torsion, pregnancy, constipation, among others. Her lab work here is largely unremarkable with no leukocytosis, anemia, and concerning electrolyte abnormality, renal or hepatic dysfunction. Lipase is normal. She is not pregnant. Urinalysis showed moderate blood however only 0-2 red blood cells. No signs of infection. With only 0-2 RBCs and pain that is relieved by laying down, I doubt renal colic in this patient. Ultrasound is normal. It does show her IUD in a slightly low position, however no other acute finding in the pelvis with good blood flow to the bilateral ovaries. Based on history and location of pain,  I doubt ovarian torsion. I do not feel she requires further imaging for this. I did discuss with the patient doing a pelvic exam with wet prep and gc chlamydia cultures, however she declined. She reports that she has an appointment with her ob tomorrow to have the IUD removed and wants to wait for this. I did feel that this was a reasonable decision. I have low suspicion for PID given her lack of pelvic pain and vaginal symptoms. We also discussed the pros and cons of CT scan in this otherwise healthy patient. She would rather go home at this time and she is not having much pain. I do feel that since she has follow up tomorrow that this is a reasonable choice and that she does not require CT at this time. Based on the location of her pain I suspect that this is likely constipation or gas related. We discussed home treatments for this and she will follow up as already scheduled for tomorrow. We discussed that she needs to return to the emergency department if she has worsening pain, fever, vomiting, or if she becomes  worse in any way. I discussed the results, plan and any additional questions with the patient and her mother. They verbalized understanding and agreement with the plan.       Diagnosis:    ICD-10-CM    1. Abdominal pain, left lower quadrant R10.32        Disposition:  Discharge to home.    I, Yuan Powers, am serving as a scribe on 3/16/2017 at 7:05 PM to personally document services performed by Teri Chambers PA-C based on my observations and the provider's statements to me.       Teri Chambers PA-C  03/17/17 1706

## 2017-03-17 NOTE — DISCHARGE INSTRUCTIONS
Discharge Instructions  Abdominal Pain    Abdominal pain can be caused by many things. Your evaluation today does not show the exact cause for your pain. Your doctor today has decided that it is unlikely your pain is due to a life threatening problem, or a problem requiring surgery or hospital admission. Sometimes those problems cannot be found right away, so it is very important that you follow up as directed.  Sometimes only the changes which occur over time allow the cause of your pain to be found.    Return to the Emergency Department for a recheck in 8-12 hours if your pain continues.  If your pain gets worse, changes in location, or feels different, return to the Emergency Department right away.    ADULTS:  Return to the Emergency Department right away if:      You get an oral temperature above 102oF or as directed by your doctor.    You have blood in your stools (bright red or black, tarry stools).    You keep throwing up or can t drink liquids.    You see blood when you throw up.    You can t have a bowel movement or you can t pass gas.    Your stomach gets bloated or bigger.    Your skin or the whites of your eyes look yellow.    You faint.    You have bloody, frequent or painful urination.    You have new symptoms or anything that worries you.    CHILDREN:  Return to the Emergency Department right away if your child has any of the above-listed symptoms or the following:      Pushes your hand away or screams/cries when his/her belly is touched.    You notice your child is very fussy or weak.    Your child is very tired and is too tired to eat or drink.    Your child is dehydrated.  Signs of dehydration can be:  o Your infant has had no wet diapers in 4-5 hours.  o Your older child has not passed urine in 6-8 hours.  o Your infant or child starts to have dry mouth and lips, or no saliva or tears.    PREGNANT WOMEN:  Return to the Emergency Department right away if you have any of the above-listed symptoms or  the following:      You have bleeding, leaking fluid or passing tissue from the vagina.    You have worse pain or cramping, or pain in your shoulder or back.    You have vomiting that will not stop.    You have painful or bloody urination.    You have a temperature of 100oF or more.    Your baby is not moving as much as usual.    You faint.    You get a bad headache with or without eye problems and abdominal pain.    You have a convulsion or seizure.    You have unusual discharge from your vagina and abdominal pain.    Abdominal pain is pretty common during pregnancy.  Your pain may or may not be related to your pregnancy. You should follow-up closely with your OB doctor so they can evaluate you and your baby.  Until you follow-up with your regular doctor, do the following:       Avoid sex and do not put anything in your vagina.    Drink clear fluids.    Only take medications approved by your doctor.    MORE INFORMATION:    Appendicitis:  A possible cause of abdominal pain in any person who still has their appendix is acute appendicitis. Appendicitis is often hard to diagnose.  Testing does not always rule out early appendicitis or other causes of abdominal pain. Close follow-up with your doctor and re-evaluations may be needed to figure out the reason for your abdominal pain.    Follow-up:  It is very important that you make an appointment with your clinic and go to the appointment.  If you do not follow-up with your primary doctor, it may result in missing an important development which could result in permanent injury or disability and/or lasting pain.  If there is any problem keeping your appointment, call your doctor or return to the Emergency Department.    Medications:  Take your medications as directed by your doctor today.  Before using over-the-counter medications, ask your doctor and make sure to take the medications as directed.  If you have any questions about medications, ask your doctor.    Diet:   "Resume your normal diet as much as possible, but do not eat fried, fatty or spicy foods while you have pain.  Do not drink alcohol or have caffeine.  Do not smoke tobacco.    Probiotics: If you have been given an antibiotic, you may want to also take a probiotic pill or eat yogurt with live cultures. Probiotics have \"good bacteria\" to help your intestines stay healthy. Studies have shown that probiotics help prevent diarrhea and other intestine problems (including C. diff infection) when you take antibiotics. You can buy these without a prescription in the pharmacy section of the store.     If you were given a prescription for medicine here today, be sure to read all of the information (including the package insert) that comes with your prescription.  This will include important information about the medicine, its side effects, and any warnings that you need to know about.  The pharmacist who fills the prescription can provide more information and answer questions you may have about the medicine.  If you have questions or concerns that the pharmacist cannot address, please call or return to the Emergency Department.     Discharge Instructions  Constipation  Your doctor has diagnosed you with constipation. Constipation can cause severe cramping pain and your physician thinks this is the cause of your abdominal pain today.  People usually recognize that they are constipated because they have difficulty having bowel movements, are not having bowel movements frequently enough, or are not having large enough bowel movements. Sometimes, especially in children or older people, you do not recognize that you are constipated until it becomes severe. The most common cause of constipation is a combination of lack of exercise and not eating enough fruits, vegetables and whole grains. Constipation can also be a side effect of medications, such as narcotics, or may be caused by a disease of the digestive system.    Return to the " Emergency Department if:    Your abdominal pain worsens or does not improve after a bowel movement.    You become very weak.    You get an oral temperature above 102oF or as directed by your doctor.    You have blood in your stools (bright red or black, tarry stools).    You keep throwing up or can t drink liquids.    Your see blood when you throw up.    Your stomach gets bloated or bigger.    You have new symptoms or anything that worries you.    What can I do to help myself?    If your doctor gave you a cathartic medication, like magnesium citrate or GoLytely  (polyethylene glycol), you can expect to have cramps and gas pains after taking it. You can expect to have a number of bowel movements and even diarrhea in the course of clearing your bowels.  You will know your bowels have been cleaned out after you pass clear liquid. The cramps and gas should let up after you have emptied your bowels. You may want to wait until morning to take this type of medication so you aren t up in the night.     Sometimes instead of cathartics, we recommend laxatives like milk of magnesia to move your bowels more slowly, or an enema to help the bowels to move. Read and follow the package directions, or follow your physician s instructions.    Once you have become very constipated, it takes time for your bowels to return to normal and you need to be very careful to prevent becoming constipated again. Take a laxative if you don t move your bowels at least every two days.       Eat foods that have a lot of fiber. Good choices are fruits, vegetables, prune juice, apple juice and high fiber cereal. Limit dairy products such as milk and cheese, since these can make constipation worse.     Drink plenty of water and other fluids.     When you feel the need to go to the bathroom, go to the bathroom. Don t hold it.    Miralax , Metamucil , Colace , Senna or fiber supplements can be used daily.  Miralax  daily is often the best choice for  children.    FOLLOW UP WITH YOUR REGULAR DOCTOR IF YOUR CONSTIPATION IS NOT IMPROVING.  Sometimes, chronic constipation requires further testing to determine the cause. If you are over 50 years old, you may need a colonoscopy if you have not had one before.   If you were given a prescription for medicine here today, be sure to read all of the information (including the package insert) that comes with your prescription.  This will include important information about the medicine, its side effects, and any warnings that you need to know about.  The pharmacist who fills the prescription can provide more information and answer questions you may have about the medicine.  If you have questions or concerns that the pharmacist cannot address, please call or return to the Emergency Department.     Remember that you can always come back to the Emergency Department if you are not able to see your regular doctor in the amount of time listed above, if you get any new symptoms, or if there is anything that worries you.

## 2017-03-20 ENCOUNTER — OFFICE VISIT (OUTPATIENT)
Dept: OBGYN | Facility: CLINIC | Age: 22
End: 2017-03-20
Payer: COMMERCIAL

## 2017-03-20 VITALS
SYSTOLIC BLOOD PRESSURE: 102 MMHG | WEIGHT: 150 LBS | HEIGHT: 70 IN | BODY MASS INDEX: 21.47 KG/M2 | HEART RATE: 64 BPM | DIASTOLIC BLOOD PRESSURE: 58 MMHG

## 2017-03-20 DIAGNOSIS — Z11.3 SCREEN FOR STD (SEXUALLY TRANSMITTED DISEASE): ICD-10-CM

## 2017-03-20 DIAGNOSIS — Z11.8 SCREENING FOR CHLAMYDIAL DISEASE: Primary | ICD-10-CM

## 2017-03-20 DIAGNOSIS — Z30.432 ENCOUNTER FOR IUD REMOVAL: ICD-10-CM

## 2017-03-20 DIAGNOSIS — Z30.017 NEXPLANON INSERTION: ICD-10-CM

## 2017-03-20 PROCEDURE — 11981 INSERTION DRUG DLVR IMPLANT: CPT | Performed by: PHYSICIAN ASSISTANT

## 2017-03-20 PROCEDURE — 58301 REMOVE INTRAUTERINE DEVICE: CPT | Performed by: PHYSICIAN ASSISTANT

## 2017-03-20 PROCEDURE — 87491 CHLMYD TRACH DNA AMP PROBE: CPT | Performed by: PHYSICIAN ASSISTANT

## 2017-03-20 PROCEDURE — 87591 N.GONORRHOEAE DNA AMP PROB: CPT | Performed by: PHYSICIAN ASSISTANT

## 2017-03-20 PROCEDURE — 99213 OFFICE O/P EST LOW 20 MIN: CPT | Mod: 25 | Performed by: PHYSICIAN ASSISTANT

## 2017-03-20 NOTE — MR AVS SNAPSHOT
"              After Visit Summary   3/20/2017    Tiffany Munoz    MRN: [id number]           Patient Information     Date Of Birth          1995        Visit Information        Provider Department      3/20/2017 10:30 AM Lora Chavarria PA-C [facility]        Today's Diagnoses     Screening for chlamydial disease    -  1    Screen for STD (sexually transmitted disease)           Follow-ups after your visit        Who to contact     If you have questions or need follow up information about today's clinic visit or your schedule please contact [facility] directly at [phone].  Normal or non-critical lab and imaging results will be communicated to you by jiglhart, letter or phone within 4 business days after the clinic has received the results. If you do not hear from us within 7 days, please contact the clinic through jiglhart or phone. If you have a critical or abnormal lab result, we will notify you by phone as soon as possible.  Submit refill requests through [employer] or call your pharmacy and they will forward the refill request to us. Please allow 3 business days for your refill to be completed.          Additional Information About Your Visit        MyChart Information     [employer] gives you secure access to your electronic health record. If you see a primary care provider, you can also send messages to your care team and make appointments. If you have questions, please call your primary care clinic.  If you do not have a primary care provider, please call [phone] and they will assist you.        Care EveryWhere ID     This is your Care EveryWhere ID. This could be used by other organizations to access your [facility] medical records  [id number]        Your Vitals Were     Pulse Height BMI (Body Mass Index)             64 5' 10\" (1.778 m) 21.52 kg/m2          Blood Pressure from Last 3 Encounters:   03/20/17 102/58   03/16/17 124/89   03/06/17 " 110/68    Weight from Last 3 Encounters:   03/20/17 150 lb (68 kg)   03/16/17 150 lb (68 kg)   03/06/17 152 lb (68.9 kg)              Today, you had the following     No orders found for display       Primary Care Provider Office Phone # Fax #    Nicole Joy Siegler, PA-C [phone]       [facility] [address]  [facility] [address]        Thank you!     Thank you for choosing [facility]  for your care. Our goal is always to provide you with excellent care. Hearing back from our patients is one way we can continue to improve our services. Please take a few minutes to complete the written survey that you may receive in the mail after your visit with us. Thank you!             Your Updated Medication List - Protect others around you: Learn how to safely use, store and throw away your medicines at www.disposemymeds.org.          This list is accurate as of: 3/20/17 11:13 AM.  Always use your most recent med list.                   Brand Name Dispense Instructions for use    amoxicillin 500 MG capsule    AMOXIL    30 capsule    Take 1 capsule (500 mg) by mouth 3 times daily

## 2017-03-20 NOTE — LETTER
[facility]  [address] [place] [id number]  Phone: [phone]  Fax: [phone]    March 20, 2017        Tiffany Munoz  [address] [place] [address]          To whom it may concern:    This patient missed work on 3/20/2017 due to a clinic visit.     Please contact me for questions or concerns.        Sincerely,        Lora Chavarria PA-C

## 2017-03-20 NOTE — PROGRESS NOTES
SUBJECTIVE:                                                   Tiffany Munoz is a 21 year old female who presents to clinic today for the following health issue(s):  Patient presents with:  IUD: here for IUD removal, and talk about other options.       HPI:  Had Mirena IUD placed one year ago and has had issues with pelvic pain since then. Went to the ER on 3/16 for stabbing pain. Pelvic US completed showed IUD in uterus, but at a lower position, not at fundus. No other abnormalities seen.     The pain comes randomly but is also worse after intercourse. No bleeding at all, which she has liked so has tried to keep it in. Feeling that she would like it out now since things have not improved over the past year.     Was previously on ocps and liked them other than forgot to take. Was also on DMPA and liked that but gained weight on it.     No LMP recorded. Patient is not currently having periods (Reason: IUD)..   Patient is sexually active, .  Using IUD for contraception.    reports that she has never smoked. She has never used smokeless tobacco.    STD testing offered?  Accepted    Health maintenance updated:  yes    Today's PHQ-2 Score:   PHQ-2 (  [employer]) 3/20/2017   Q1: Little interest or pleasure in doing things 0   Q2: Feeling down, depressed or hopeless 0   PHQ-2 Score 0     Today's PHQ-9 Score:   PHQ-9 SCORE 2016   Total Score 12     Today's NGA-7 Score:   NGA-7 SCORE 2017   Total Score 18       Problem list and histories reviewed & adjusted, as indicated.  Additional history: as documented.    Patient Active Problem List   Diagnosis     Malaise and fatigue     Contraception     Mild major depression (H)     Anxiety     Menorrhagia     Past Surgical History   Procedure Laterality Date     Orthopedic surgery        Social History   Substance Use Topics     Smoking status: Never Smoker     Smokeless tobacco: Never Used     Alcohol use No      Problem (# of Occurrences) Relation  "(Name,Age of Onset)    CEREBROVASCULAR DISEASE (1) Maternal Grandmother (70)    Unknown/Adopted (2) Mother, Father       Negative family history of: CANCER, HEART DISEASE, DIABETES, Coronary Artery Disease, Hypertension, Hyperlipidemia, Breast Cancer, Colon Cancer, Prostate Cancer, Other Cancer, Depression, Anxiety Disorder, MENTAL ILLNESS, Substance Abuse, Anesthesia Reaction, Asthma, OSTEOPOROSIS, Genetic Disorder, Thyroid Disease, Obesity            Current Outpatient Prescriptions   Medication Sig     etonogestrel (IMPLANON/NEXPLANON) 68 MG IMPL 1 each (68 mg) by Subdermal route once for 1 dose     amoxicillin (AMOXIL) 500 MG capsule Take 1 capsule (500 mg) by mouth 3 times daily     No current facility-administered medications for this visit.      Allergies   Allergen Reactions     Dust Mites        ROS:  12 point review of systems negative other than symptoms noted below.  Constitutional: Fatigue and Loss of Appetite  Gastrointestinal: Abdominal Pain, Bloating and Constipation  Genitourinary: Cramps and Vaginal Dryness  Psychiatric: Anxiety    OBJECTIVE:     /58  Pulse 64  Ht 5' 10\" (1.778 m)  Wt 150 lb (68 kg)  BMI 21.52 kg/m2  Body mass index is 21.52 kg/(m^2).    Exam:  Constitutional:  Appearance: Well nourished, well developed alert, in no acute distress  Skin:General Inspection:  No rashes present, no lesions present, no areas of discoloration; Genitalia and Groin:  No rashes present, no lesions present, no areas of discoloration, no masses present.  Neurologic/Psychiatric:  Mental Status:  Oriented X3   Pelvic Exam:  External Genitalia:     Normal appearance for age, no discharge present, no tenderness present, no inflammatory lesions present, color normal  Vagina:    Normal vaginal vault without central or paravaginal defects, no discharge present, no inflammatory lesions present, no masses present  Bladder:     Nontender to palpation  Urethra:   Urethral Body:  Urethra palpation normal, urethra " structural support normal   Urethral Meatus:  No erythema or lesions present  Cervix:     Appearance healthy, no lesions present, nontender to palpation, no bleeding present, string present  Uterus:     Nontender to palpation, no masses present, position anteflexed, mobility: normal  Adnexa:     No adnexal tenderness present, no adnexal masses present  Perineum:     Perineum within normal limits, no evidence of trauma, no rashes or skin lesions present  Anus:     Anus within normal limits, no hemorrhoids present  Inguinal Lymph Nodes:     No lymphadenopathy present  Pubic Hair:     Normal pubic hair distribution for age  Genitalia and Groin:     No rashes present, no lesions present, no areas of discoloration, no masses present     In-Clinic Test Results:  No results found for this or any previous visit (from the past 24 hour(s)).    ASSESSMENT/PLAN:                                                        ICD-10-CM    1. Screening for chlamydial disease Z11.8 CHLAMYDIA TRACHOMATIS PCR   2. Screen for STD (sexually transmitted disease) Z11.3 NEISSERIA GONORRHOEA PCR   3. Nexplanon insertion Z30.017 INSERTION NON-BIODEGRADABLE DRUG DELIVERY IMPLANT     ETONOGESTREL IMPLANT SYSTEM     etonogestrel (IMPLANON/NEXPLANON) 68 MG IMPL   4. Encounter for IUD removal Z30.432 REMOVE INTRAUTERINE DEVICE       Due to ongoing pelvic pain since IUD placement, pt desires removal today. IUD removed without difficulty.   Reviewed all other methods of contraception and pt would like Nexplanon. No CI to this method. Discussed possible side effects including irregular bleeding. Risks including infection, bruising.     See below.     INDICATIONS:                                                      Is a pregnancy test required: No.  Was a consent obtained?  Yes    Tiffany Munoz is a 21 year old female,, No LMP recorded. Patient is not currently having periods (Reason: IUD). who presents today for IUD removal. Her current IUD  was placed 2016 ago. She has had problems including pelvic pain with the IUD. She requests removal of the IUD because she wants to change her method of contraception    Today's PHQ-2 Score:   PHQ-2 (  [employer]) 3/20/2017   Q1: Little interest or pleasure in doing things 0   Q2: Feeling down, depressed or hopeless 0   PHQ-2 Score 0       PROCEDURE:                                                      A speculum exam was performed and the cervix was visualized. The IUD string was visualized. Using ring forceps, the string  was grasped and the IUD removed intact.    POST PROCEDURE:                                                      The patient tolerated the procedure well. Patient was discharged in stable condition.    Call if bleeding, pain or fever occur. and Birth control counseling given.       INDICATIONS:                                                      Patient presents for placement of Nexplanon for contraception.  She has had been counseled on side effects, risks, benefits and alternatives.  Patient desires to proceed.    Is a pregnancy test required: No.  Was a consent obtained?  Yes    Verification of Procedure:  Just before the procedure begans through verbal and active participation of team members, I verified:     Initials   Patient Name    Patient     Procedure to be performed      Consent:  Risks, benefits of treatment, and alternative options for contraception were discussed.  Patient's questions were elicited and answered.  Written consent was obtained and scanned into medical record.     Today's PHQ-2 Score:   PHQ-2 (  [employer]) 3/20/2017   Q1: Little interest or pleasure in doing things 0   Q2: Feeling down, depressed or hopeless 0   PHQ-2 Score 0       PROCEDURE:                                                      Patient was resting comfortably on exam table, left arm placed at shoulder level in a 90 degree angle.  Skin was marked 8cm from epicondyl and cleansed with  betadine solution.  Insertion site and track infiltrated with 2 cc 1% Lidocaine.      Nexplanon device visualized in applicator by patient and provider.  Skin punctured with applicator at insertion site and advanced with ease in the subdermal space.  Applicator was removed.  Nexplanon was palpated by provider and patient.    Small amount of bleeding noted at insertion site and no bruising noted along track of Nexplanon.  Bandage and pressure dressing applied to insertion site.         POST PROCEDURE:                                                      To be removed/replaced within three years. Written and verbal instructions provided to patient.  She tolerated the procedure well. There were no complications. Patient was discharged in stable condition.    Keep dressing on and dry for 24 hours, then remove wrap.  Replace bandaid daily for 5 days. Keep your user card in a safe place where you'll remember it.  Make note of today's date for removal/replacement of your Nexplanon in 3 years. Call us if there is any redness, tenderness, warmth or drainage from the area of your Nexplanon insertion. Use a backup method of birth control for 7 days.      Lora Chavarria PA-C  [facility] [place]

## 2017-03-21 LAB
C TRACH DNA SPEC QL NAA+PROBE: NORMAL
N GONORRHOEA DNA SPEC QL NAA+PROBE: NORMAL
SPECIMEN SOURCE: NORMAL
SPECIMEN SOURCE: NORMAL

## 2017-04-10 ENCOUNTER — OFFICE VISIT (OUTPATIENT)
Dept: MIDWIFE SERVICES | Facility: CLINIC | Age: 22
End: 2017-04-10
Payer: COMMERCIAL

## 2017-04-10 VITALS — WEIGHT: 153 LBS | SYSTOLIC BLOOD PRESSURE: 114 MMHG | DIASTOLIC BLOOD PRESSURE: 74 MMHG | BODY MASS INDEX: 21.95 KG/M2

## 2017-04-10 DIAGNOSIS — N89.8 ITCHING OF VAGINA: ICD-10-CM

## 2017-04-10 DIAGNOSIS — N89.8 VAGINAL DISCHARGE: Primary | ICD-10-CM

## 2017-04-10 LAB
MICRO REPORT STATUS: NORMAL
SPECIMEN SOURCE: NORMAL
WET PREP SPEC: NORMAL

## 2017-04-10 PROCEDURE — 87210 SMEAR WET MOUNT SALINE/INK: CPT | Performed by: ADVANCED PRACTICE MIDWIFE

## 2017-04-10 PROCEDURE — 99213 OFFICE O/P EST LOW 20 MIN: CPT | Performed by: ADVANCED PRACTICE MIDWIFE

## 2017-04-10 RX ORDER — FLUCONAZOLE 150 MG/1
150 TABLET ORAL ONCE
Qty: 2 TABLET | Refills: 0 | Status: SHIPPED | OUTPATIENT
Start: 2017-04-10 | End: 2017-04-10

## 2017-04-10 RX ORDER — HYDROXYZINE HYDROCHLORIDE 10 MG/1
TABLET, FILM COATED ORAL
Refills: 0 | COMMUNITY
Start: 2016-05-18 | End: 2017-04-10

## 2017-04-10 RX ORDER — CITALOPRAM HYDROBROMIDE 40 MG/1
TABLET ORAL
Refills: 1 | COMMUNITY
Start: 2016-05-18 | End: 2017-04-10

## 2017-04-10 NOTE — MR AVS SNAPSHOT
After Visit Summary   4/10/2017    Tiffany Munoz    MRN: [id number]           Patient Information     Date Of Birth          1995        Visit Information        Provider Department      4/10/2017 11:00 AM Lolly Martinez APRN CNM [facility]        Today's Diagnoses     Vaginal discharge    -  1    Itching of vagina          Care Instructions    Vaginitis (Vaginal Irritation/Infection)    Vaginitis is very common!  The most common vaginal infections are bacterial vaginosis or yeast. These infections are not sexually transmitted but can be incredibly uncomfortable. Seek care from your midwife if signs or symptoms arise.     Normal vaginal discharge:      Is white, clear, thick or thin (it may change depending on where you are in your cycle)    Does not have a foul odor    The amount of discharge varies    Abnormal discharge/symptoms:       Itching in and around the vagina    Redness, pain or swelling    Discharge that is foamy, greenish, curd like, or bloody    Foul smelling odor    Pain when urinating or having sex    Fever    Causes of vaginal infections:      Good bacteria from the vagina have been destroyed by bad bacteria    Reaction to something in the vagina such as a tampon or scented/perfumed soaps or bubble bath    STI's    Sensitivities to soaps/detergents/dryer sheets, lubricants, etc.    Hormonal changes    Recent use of antibiotics     Infections can also occur after you've had intercourse with a new partner or if you have had frequent intercourse         Here is a list of suggestions that may help prevent/treat vaginal infections and will help maintain a healthy vaginal environment:      1.  Boosting your immune system so you can heal faster      Make sure you are getting adequate sleep    Drink 2-3 quarts of fluids per day, Cranberries or cranberry juice (unsweetened)    Eat more nuts, grains, raw veggies, yogurt, wili, grapefruit    Decrease  intake of refined sugar, red meat and alcohol    Echinacea - 3 times a day for chronic problem or every 2 hours for acute symptoms; use as directed on bottle          2.  Changing the vaginal environment to a more acid state       Soak in a warm bath tub with one cup of vinegar or lemon juice. Do not use scented soap, bubble bath, or oils.     Acidophilus capsules:  1 in your vagina at bedtime for 5-7 nights    Herbal sitz bath or jaden-wash with:  TBSP tea tree oil or 2 TBS cider vinegar      3.  Increasing the good healthy bacteria      At each meal drink 1 tsp apple cider vinegar and 1 tsp honey in   cup warm water    Eat garlic daily, capsules or fresh.      Take probiotics 4-8 billion units/day      4.  Preventive measures      Wear cotton underwear, no thongs.  Do not wear tight clothes or pantyhose    Shower soon after working or change out of sweaty clothing     Do not wear underwear to bed.  The vaginal environment needs to breathe    Never douche or use vaginal , the vagina is self-cleaning!    Use white, unscented toilet paper.  Do not use baby wipes.  Wipe from front to back    Use only unscented tampons and pads, buy organic products if desired    Do not use perfumes/oils/lotions near your vagina or take bubble baths    Use only mild, unscented soaps around your vaginal area     Do not use fabric softeners/dryer sheets    Use gentle, unscented detergent, consider buying non-petroleum based detergents    Use only water based lubricants during sexual contact    Abstain from intercourse during times of infection    Alternative Treatment  Boric acid capsules one per vagina (not by mouth!!! Very toxic if taken orally) at bedtime for 5 days (or as suggested by your provider) may be an effective alternative treatment and also more effective for those with chronic yeast vaginitis. Boric acid is available at the pharmacy but must be purchased along with gelatin caps for insertion. It might also be  available at a local compounding pharmacy. Boric acid is not safe for pregnant women. Discuss with your midwife if this treatment interests you.     If your symptoms do not resolve or if you have questions please call:     [facility]   [phone]                    Follow-ups after your visit        Follow-up notes from your care team     Return if symptoms worsen or fail to improve.      Your next 10 appointments already scheduled     Apr 11, 2017  9:30 AM CDT   SHORT with Nicole Joy Siegler, PA-C   [facility] ([facility])    [address] [place] [id number] [phone]              Who to contact     If you have questions or need follow up information about today's clinic visit or your schedule please contact [facility] directly at [phone].  Normal or non-critical lab and imaging results will be communicated to you by MyChart, letter or phone within 4 business days after the clinic has received the results. If you do not hear from us within 7 days, please contact the clinic through [employer] or phone. If you have a critical or abnormal lab result, we will notify you by phone as soon as possible.  Submit refill requests through [employer] [facility] or call your pharmacy and they will forward the refill request to us. Please allow 3 business days for your refill to be completed.          Additional Information About Your Visit        MyChart Information     [employer] [facility] gives you secure access to your electronic health record. If you see a primary care provider, you can also send messages to your care team and make appointments. If you have questions, please call your primary care clinic.  If you do not have a primary care provider, please call [phone] and they will assist you.        Care EveryWhere ID     This is your Care EveryWhere ID. This could be used by other organizations to access your  [facility] medical records  [id number]        Your Vitals Were     Breastfeeding? BMI (Body Mass Index)                No 21.95 kg/m2           Blood Pressure from Last 3 Encounters:   04/10/17 114/74   03/20/17 102/58   03/16/17 124/89    Weight from Last 3 Encounters:   04/10/17 153 lb (69.4 kg)   03/20/17 150 lb (68 kg)   03/16/17 150 lb (68 kg)              We Performed the Following     Wet prep          Today's Medication Changes          These changes are accurate as of: 4/10/17 11:35 AM.  If you have any questions, ask your nurse or doctor.               Start taking these medicines.        Dose/Directions    fluconazole 150 MG tablet   Commonly known as:  DIFLUCAN   Used for:  Vaginal discharge, Itching of vagina   Started by:  Lolly Martinez APRN CNM        Dose:  150 mg   Take 1 tablet (150 mg) by mouth once for 1 dose Take one tablet now, repeat dose after metronidazole is finished   Quantity:  2 tablet   Refills:  0            Where to get your medicines      These medications were sent to [id number] - [facility] [address] [facility]  [address] [address] [place] [address]     Phone:  [phone]     fluconazole 150 MG tablet                Primary Care Provider Office Phone # Fax #    Nicole Joy Siegler, PA-C [phone]       [facility] [address] [facility] [address]  [facility] [address]        Thank you!     Thank you for choosing [facility] [place]  for your care. Our goal is always to provide you with excellent care. Hearing back from our patients is one way we can continue to improve our services. Please take a few minutes to complete the written survey that you may receive in the mail after your visit with us. Thank you!             Your Updated Medication List - Protect others around you: Learn how to safely use, store and throw away your medicines at www.disposemymeds.org.          This list is accurate as of: 4/10/17 11:35 AM.   Always use your most recent med list.                   Brand Name Dispense Instructions for use    etonogestrel 68 MG Impl    IMPLANON/NEXPLANON    1 each    1 each (68 mg) by Subdermal route once for 1 dose       fluconazole 150 MG tablet    DIFLUCAN    2 tablet    Take 1 tablet (150 mg) by mouth once for 1 dose Take one tablet now, repeat dose after metronidazole is finished

## 2017-04-10 NOTE — PATIENT INSTRUCTIONS
Vaginitis (Vaginal Irritation/Infection)    Vaginitis is very common!  The most common vaginal infections are bacterial vaginosis or yeast. These infections are not sexually transmitted but can be incredibly uncomfortable. Seek care from your midwife if signs or symptoms arise.     Normal vaginal discharge:      Is white, clear, thick or thin (it may change depending on where you are in your cycle)    Does not have a foul odor    The amount of discharge varies    Abnormal discharge/symptoms:       Itching in and around the vagina    Redness, pain or swelling    Discharge that is foamy, greenish, curd like, or bloody    Foul smelling odor    Pain when urinating or having sex    Fever    Causes of vaginal infections:      Good bacteria from the vagina have been destroyed by bad bacteria    Reaction to something in the vagina such as a tampon or scented/perfumed soaps or bubble bath    STI's    Sensitivities to soaps/detergents/dryer sheets, lubricants, etc.    Hormonal changes    Recent use of antibiotics     Infections can also occur after you've had intercourse with a new partner or if you have had frequent intercourse         Here is a list of suggestions that may help prevent/treat vaginal infections and will help maintain a healthy vaginal environment:      1.  Boosting your immune system so you can heal faster      Make sure you are getting adequate sleep    Drink 2-3 quarts of fluids per day, Cranberries or cranberry juice (unsweetened)    Eat more nuts, grains, raw veggies, yogurt, wili, grapefruit    Decrease intake of refined sugar, red meat and alcohol    Echinacea - 3 times a day for chronic problem or every 2 hours for acute symptoms; use as directed on bottle          2.  Changing the vaginal environment to a more acid state       Soak in a warm bath tub with one cup of vinegar or lemon juice. Do not use scented soap, bubble bath, or oils.     Acidophilus capsules:  1 in your vagina at bedtime for 5-7  nights    Herbal sitz bath or jaden-wash with:  TBSP tea tree oil or 2 TBS cider vinegar      3.  Increasing the good healthy bacteria      At each meal drink 1 tsp apple cider vinegar and 1 tsp honey in   cup warm water    Eat garlic daily, capsules or fresh.      Take probiotics 4-8 billion units/day      4.  Preventive measures      Wear cotton underwear, no thongs.  Do not wear tight clothes or pantyhose    Shower soon after working or change out of sweaty clothing     Do not wear underwear to bed.  The vaginal environment needs to breathe    Never douche or use vaginal , the vagina is self-cleaning!    Use white, unscented toilet paper.  Do not use baby wipes.  Wipe from front to back    Use only unscented tampons and pads, buy organic products if desired    Do not use perfumes/oils/lotions near your vagina or take bubble baths    Use only mild, unscented soaps around your vaginal area     Do not use fabric softeners/dryer sheets    Use gentle, unscented detergent, consider buying non-petroleum based detergents    Use only water based lubricants during sexual contact    Abstain from intercourse during times of infection    Alternative Treatment  Boric acid capsules one per vagina (not by mouth!!! Very toxic if taken orally) at bedtime for 5 days (or as suggested by your provider) may be an effective alternative treatment and also more effective for those with chronic yeast vaginitis. Boric acid is available at the pharmacy but must be purchased along with gelatin caps for insertion. It might also be available at a local compounding pharmacy. Boric acid is not safe for pregnant women. Discuss with your midwife if this treatment interests you.     If your symptoms do not resolve or if you have questions please call:     [facility]   [phone]

## 2017-04-10 NOTE — PROGRESS NOTES
SUBJECTIVE:   Tiffany is a 21 year old here for vaginal symptoms:  Vaginal odor, discharge, and itching                  Vaginal Symptoms     Onset: x1.5 weeks    Description:  Vaginal Discharge: white  Itching (Pruritis): Yes  Burning sensation:  Yes  Odor:  Yes  Irritation:  Yes    Accompanying Signs & Symptoms:  Pain with Urination: No  Abdominal Pain:  No  Fever: No   History:   Sexually active:  Yes  New Partner:  No  Possibility of Pregnancy:  No  Contraceptive type: Nexplanon    Precipitating factors:   Recent Antibiotic Use: Yes: Amoxicillin March 2017    Alleviating factors:  none   Therapies Tried and outcome: none  Previous Episodes of Vaginitis:  Yes: few months ago      Other associated symptoms: none.  History of STI's:  No  STI Testing offered: NO (Recommended)    LMP: No LMP recorded. Patient has had an implant.      Patient Active Problem List   Diagnosis     Malaise and fatigue     Contraception     Mild major depression (H)     Anxiety     Menorrhagia     Past Medical History:   Diagnosis Date     Menorrhagia      Past Surgical History:   Procedure Laterality Date     ORTHOPEDIC SURGERY       Current Outpatient Prescriptions   Medication Sig Dispense Refill     fluconazole (DIFLUCAN) 150 MG tablet Take 1 tablet (150 mg) by mouth once for 1 dose Take one tablet now, repeat dose after metronidazole is finished 2 tablet 0     etonogestrel (IMPLANON/NEXPLANON) 68 MG IMPL 1 each (68 mg) by Subdermal route once for 1 dose 1 each 0     Allergies   Allergen Reactions     Dust Mites        Health maintenance updated:  yes    ROS:   12 point review of systems negative other than symptoms noted below.  Constitutional: Fatigue and Loss of Appetite  Genitourinary: Painful [place], Vaginal Discharge, Vaginal Dryness and Vaginal Itching    PHYSICAL EXAM:    /74  Wt 153 lb (69.4 kg)  Breastfeeding? No  BMI 21.95 kg/m2, Body mass index is 21.95 kg/(m^2).  General appearance:  healthy, alert and no  distress  Pelvic Exam:  Vulva: No lesions, no adenopathy, BUS:  wnl.   Vagina: Moist, pink, discharge normal  well rugated, no lesions  Cervix: smooth, pink, no visible lesions.  Rectal exam: deferred    ASSESSMENT/PLAN:     ICD-10-CM    1. Vaginal discharge N89.8 Wet prep     fluconazole (DIFLUCAN) 150 MG tablet   2. Itching of vagina L29.8 fluconazole (DIFLUCAN) 150 MG tablet       Results for orders placed or performed in visit on 04/10/17   Wet prep   Result Value Ref Range    Specimen Description Vagina     Wet Prep       No Trichomonas seen  No clue cells seen  No yeast seen      Micro Report Status FINAL 04/10/2017          COUNSELING:  Abstain from sexual intercourse while being treated for vaginal infection  Handout provided about vaginitis and how to prevent future infections.  Return to clinic if symptoms persist or worsen    Lolly Martinez, DNP, APRN, CNM      20 minutes was spent face to face with the patient today discussing her history, diagnosis, and follow-up plan as noted above. Over 50% of the visit was spent in counseling and coordination of care.    Total Visit Time: 20 minutes.

## 2017-04-17 NOTE — PROGRESS NOTES
Jessica Allen,    Your results showed that you did not have trichomonas, yeast, or BV. Good news! If you have having any more issues, please give me or the clinic a call.    Thank you,   ROEL Stringer CNM

## 2017-12-18 ENCOUNTER — TELEPHONE (OUTPATIENT)
Dept: NURSING | Facility: CLINIC | Age: 22
End: 2017-12-18

## 2017-12-18 NOTE — TELEPHONE ENCOUNTER
Have her do home pregnancy test  If negative she can wait it out for a couple more weeks and see if it stops

## 2018-03-06 ENCOUNTER — OFFICE VISIT (OUTPATIENT)
Dept: FAMILY MEDICINE | Facility: CLINIC | Age: 23
End: 2018-03-06
Payer: COMMERCIAL

## 2018-03-06 VITALS
DIASTOLIC BLOOD PRESSURE: 80 MMHG | HEIGHT: 70 IN | OXYGEN SATURATION: 98 % | TEMPERATURE: 98.1 F | BODY MASS INDEX: 23.05 KG/M2 | SYSTOLIC BLOOD PRESSURE: 120 MMHG | WEIGHT: 161 LBS | RESPIRATION RATE: 22 BRPM | HEART RATE: 78 BPM

## 2018-03-06 DIAGNOSIS — Z23 NEED FOR VACCINATION: ICD-10-CM

## 2018-03-06 DIAGNOSIS — N92.1 MENORRHAGIA WITH IRREGULAR CYCLE: ICD-10-CM

## 2018-03-06 DIAGNOSIS — F33.1 MODERATE EPISODE OF RECURRENT MAJOR DEPRESSIVE DISORDER (H): Primary | ICD-10-CM

## 2018-03-06 DIAGNOSIS — Z23 NEED FOR PROPHYLACTIC VACCINATION WITH TETANUS-DIPHTHERIA (TD): ICD-10-CM

## 2018-03-06 DIAGNOSIS — N94.6 DYSMENORRHEA: ICD-10-CM

## 2018-03-06 PROCEDURE — 90471 IMMUNIZATION ADMIN: CPT | Performed by: FAMILY MEDICINE

## 2018-03-06 PROCEDURE — 99214 OFFICE O/P EST MOD 30 MIN: CPT | Mod: 25 | Performed by: FAMILY MEDICINE

## 2018-03-06 PROCEDURE — 90715 TDAP VACCINE 7 YRS/> IM: CPT | Performed by: FAMILY MEDICINE

## 2018-03-06 RX ORDER — VENLAFAXINE HYDROCHLORIDE 37.5 MG/1
CAPSULE, EXTENDED RELEASE ORAL
Qty: 60 CAPSULE | Refills: 0 | Status: SHIPPED | OUTPATIENT
Start: 2018-03-06 | End: 2018-03-16 | Stop reason: DRUGHIGH

## 2018-03-06 ASSESSMENT — ANXIETY QUESTIONNAIRES
5. BEING SO RESTLESS THAT IT IS HARD TO SIT STILL: MORE THAN HALF THE DAYS
7. FEELING AFRAID AS IF SOMETHING AWFUL MIGHT HAPPEN: NEARLY EVERY DAY
2. NOT BEING ABLE TO STOP OR CONTROL WORRYING: NEARLY EVERY DAY
GAD7 TOTAL SCORE: 20
1. FEELING NERVOUS, ANXIOUS, OR ON EDGE: NEARLY EVERY DAY
IF YOU CHECKED OFF ANY PROBLEMS ON THIS QUESTIONNAIRE, HOW DIFFICULT HAVE THESE PROBLEMS MADE IT FOR YOU TO DO YOUR WORK, TAKE CARE OF THINGS AT HOME, OR GET ALONG WITH OTHER PEOPLE: EXTREMELY DIFFICULT
6. BECOMING EASILY ANNOYED OR IRRITABLE: NEARLY EVERY DAY
3. WORRYING TOO MUCH ABOUT DIFFERENT THINGS: NEARLY EVERY DAY

## 2018-03-06 ASSESSMENT — PATIENT HEALTH QUESTIONNAIRE - PHQ9: 5. POOR APPETITE OR OVEREATING: NEARLY EVERY DAY

## 2018-03-06 NOTE — NURSING NOTE
"Chief Complaint   Patient presents with     Contraception     /80  Pulse 78  Temp 98.1  F (36.7  C) (Tympanic)  Resp 22  Ht 5' 10\" (1.778 m)  Wt 161 lb (73 kg)  LMP  (LMP Unknown)  SpO2 98%  Breastfeeding? No  BMI 23.1 kg/m2 Estimated body mass index is 23.1 kg/(m^2) as calculated from the following:    Height as of this encounter: 5' 10\" (1.778 m).    Weight as of this encounter: 161 lb (73 kg).  BP completed using cuff size: regular   Gretchen Herrera CMA    Health Maintenance Due   Topic Date Due     HPV IMMUNIZATION (2 of 2 - Female 2 Dose Series) 11/02/2007     NGA QUESTIONNAIRE 1 MONTH  03/28/2017     TETANUS IMMUNIZATION (SYSTEM ASSIGNED)  05/02/2017     PHQ-9 Q6 MONTHS  06/14/2017     CHLAMYDIA SCREENING  03/20/2018     Health Maintenance reviewed at today's visit patient asked to schedule/complete:   Chlamydia:  Patient agrees to schedule  Depression:  Patient agrees to schedule  Immunizations:  Patient agrees to schedule    "

## 2018-03-06 NOTE — PROGRESS NOTES
SUBJECTIVE:   Tiffany Munoz is a 22 year old female who presents to clinic today for the following health issues:    Vaginal Bleeding Menorrhagia &Dysmenorrhea      Onset: x 2-3 months    Description:  Duration of bleeding episodes: 4 days  Frequency between periods:  Random; q 2-4 weeks   Describe bleeding/flow:   Clots: no  Number of pads/hour: 5 tampons a day  Cramping: None    Intensity:  mild    Accompanying signs and symptoms: Increased Mood Swings    History (similar episodes/previous evaluation): None    Precipitating or alleviating factors: None    Therapies tried and outcome: None    implanon implanted 3-17 for the above problems as well as for contraception and was OK with intermittent bleeding and spotting till worse the last 3 mo     Depression and Anxiety Follow-Up      Status since last visit: Worsened     Other associated symptoms:None    Complicating factors:     Significant life event: No     Current substance abuse: None    Meds: 0     Hx: venlafaxine 75mgm bid for 2 mo in 2014 or -15     celexa in chart for 2mo in 2016, but pt doesn't recall ever getting or taking it     PHQ-9 4/25/2016 5/18/2016 12/14/2016   Total Score 2 3 12   Q9: Suicide Ideation Not at all Not at all Not at all     NGA-7 SCORE 4/25/2016 5/18/2016 2/28/2017   Total Score 7 13 18       PHQ-9  English=19##with score of 1 for harm/suicide , but pt states no definite plan   PHQ-9   Any Language  NGA-7=20   Suicide Assessment Five-step Evaluation and Treatment (SAFE-T)      Amount of exercise or physical activity: 4-5 days/week for an average of greater than 60 minutes    Problems taking medications regularly: No    Medication side effects: none    Diet: regular (no restrictions)        Problem list and histories reviewed & adjusted, as indicated.  Additional history: as documented    Labs reviewed in EPIC    Reviewed and updated as needed this visit by clinical staff       Reviewed and updated as needed this visit by  "Provider         ROS:  CONSTITUTIONAL: NEGATIVE for fever, chills, change in weight  INTEGUMENTARY/SKIN: NEGATIVE for worrisome rashes, moles or lesions  EYES: NEGATIVE for vision changes or irritation  ENT/MOUTH: NEGATIVE for ear, mouth and throat problems  RESP: NEGATIVE for significant cough or SOB  BREAST: NEGATIVE for masses, tenderness or discharge  CV: NEGATIVE for chest pain, palpitations or peripheral edema  GI: NEGATIVE for nausea, abdominal pain, heartburn, or change in bowel habits   female: dysmenorrhea and irregular vaginal bleeding  MUSCULOSKELETAL: NEGATIVE for significant arthralgias or myalgia  NEURO: NEGATIVE for weakness, dizziness or paresthesias  ENDOCRINE: NEGATIVE for temperature intolerance, skin/hair changes  HEME: NEGATIVE for bleeding problems  PSYCHIATRIC: depressed, anxious, no panic attacks     OBJECTIVE:     /80  Pulse 78  Temp 98.1  F (36.7  C) (Tympanic)  Resp 22  Ht 5' 10\" (1.778 m)  Wt 161 lb (73 kg)  LMP  (LMP Unknown)  SpO2 98%  Breastfeeding? No  BMI 23.1 kg/m2  Body mass index is 23.1 kg/(m^2).  GENERAL: healthy, alert and no distress  EYES: Eyes grossly normal to inspection, PERRL and conjunctivae and sclerae normal  RESP: lungs clear to auscultation - no rales, rhonchi or wheezes  MS: no gross musculoskeletal defects noted, no edema  SKIN: no suspicious lesions or rashes  NEURO: Normal strength and tone, mentation intact and speech normal  PSYCH: mentation appears normal, affect normal/bright, affect flat, judgement and insight intact and appearance well groomed    Diagnostic Test Results:  none     ASSESSMENT/PLAN:               ICD-10-CM    1. Moderate episode of recurrent major depressive disorder (H) issue of reinitiating Rx  F33.1 venlafaxine (EFFEXOR-XR) 37.5 MG 24 hr capsule   2. Dysmenorrhea N94.6    3. Menorrhagia with irregular cycle N92.1    4. Need for prophylactic vaccination with tetanus-diphtheria (TD) Z23    5. Need for vaccination Z23 TDAP " VACCINE (ADACEL) [38591.002]     1st  Administration  [13357]       Patient Instructions   1. Please see me next wk with a calendar of what you actually took of the venlafaxine     Start with one a day  And increase as per the bottle depending on how anxious you are   Want to get up to your prior 75mgm bid and could go higher if needed     2. Consider the implanon :  You could return to BCPs and control the periods better     If removal desired, need to go to GYN     She is seriousl y considering this , as her vacation 2 weeks ago was wrecked by continual bleeding     Codie De La Vega MD  [facility]

## 2018-03-06 NOTE — NURSING NOTE
Screening Questionnaire for Adult Immunization    Are you sick today?   No   Do you have allergies to medications, food, a vaccine component or latex?   No   Have you ever had a serious reaction after receiving a vaccination?   No   Do you have a long-term health problem with heart disease, lung disease, asthma, kidney disease, metabolic disease (e.g. diabetes), anemia, or other blood disorder?   No   Do you have cancer, leukemia, HIV/AIDS, or any other immune system problem?   No   In the past 3 months, have you taken medications that affect  your immune system, such as prednisone, other steroids, or anticancer drugs; drugs for the treatment of rheumatoid arthritis, Crohn s disease, or psoriasis; or have you had radiation treatments?   No   Have you had a seizure, or a brain or other nervous system problem?   No   During the past year, have you received a transfusion of blood or blood     products, or been given immune (gamma) globulin or antiviral drug?   No   For women: Are you pregnant or is there a chance you could become        pregnant during the next month?   No   Have you received any vaccinations in the past 4 weeks?   No     Immunization questionnaire answers were all negative.        Per orders of Dr. De La Vega, injection of TDAP given by Fanny Bernard. Patient instructed to remain in clinic for 15 minutes afterwards, and to report any adverse reaction to me immediately.       Screening performed by Fanny Bernard on 3/6/2018 at 2:43 PM.

## 2018-03-06 NOTE — LETTER
My Depression Action Plan  Name: Tiffany Munoz   Date of Birth 1995  Date: 3/6/2018    My doctor: Siegler, Nicole Joy   My clinic: [address] [address]  [phone]          GREEN    ZONE   Good Control    What it looks like:     Things are going generally well. You have normal up s and down s. You may even feel depressed from time to time, but bad moods usually last less than a day.   What you need to do:  1. Continue to care for yourself (see self care plan)  2. Check your depression survival kit and update it as needed  3. Follow your physician s recommendations including any medication.  4. Do not stop taking medication unless you consult with your physician first.           YELLOW         ZONE Getting Worse    What it looks like:     Depression is starting to interfere with your life.     It may be hard to get out of bed; you may be starting to isolate yourself from others.    Symptoms of depression are starting to last most all day and this has happened for several days.     You may have suicidal thoughts but they are not constant.   What you need to do:     1. Call your care team, your response to treatment will improve if you keep your care team informed of your progress. Yellow periods are signs an adjustment may need to be made.     2. Continue your self-care, even if you have to fake it!    3. Talk to someone in your support network    4. Open up your depression survival kit           RED    ZONE Medical Alert - Get Help    What it looks like:     Depression is seriously interfering with your life.     You may experience these or other symptoms: You can t get out of bed most days, can t work or engage in other necessary activities, you have trouble taking care of basic hygiene, or basic responsibilities, thoughts of suicide or death that will not go away, self-injurious behavior.     What you need to  do:  1. Call your care team and request a same-day appointment. If they are not available (weekends or after hours) call your local crisis line, emergency room or 911.      Electronically signed by: Codie De La Vega, March 6, 2018    Depression Self Care Plan / Survival Kit    Self-Care for Depression  Here s the deal. Your body and mind are really not as separate as most people think.  What you do and think affects how you feel and how you feel influences what you do and think. This means if you do things that people who feel good do, it will help you feel better.  Sometimes this is all it takes.  There is also a place for medication and therapy depending on how severe your depression is, so be sure to consult with your medical provider and/ or Behavioral Health Consultant if your symptoms are worsening or not improving.     In order to better manage my stress, I will:    Exercise  Get some form of exercise, every day. This will help reduce pain and release endorphins, the  feel good  chemicals in your brain. This is almost as good as taking antidepressants!  This is not the same as joining a gym and then never going! (they count on that by the way ) It can be as simple as just going for a walk or doing some gardening, anything that will get you moving.      Hygiene   Maintain good hygiene (Get out of bed in the morning, Make your bed, Brush your teeth, Take a shower, and Get dressed like you were going to work, even if you are unemployed).  If your clothes don't fit try to get ones that do.    Diet  I will strive to eat foods that are good for me, drink plenty of water, and avoid excessive sugar, caffeine, alcohol, and other mood-altering substances.  Some foods that are helpful in depression are: complex carbohydrates, B vitamins, flaxseed, fish or fish oil, fresh fruits and vegetables.    Psychotherapy  I agree to participate in Individual Therapy (if recommended).    Medication  If prescribed medications, I  agree to take them.  Missing doses can result in serious side effects.  I understand that drinking alcohol, or other illicit drug use, may cause potential side effects.  I will not stop my medication abruptly without first discussing it with my provider.    Staying Connected With Others  I will stay in touch with my friends, family members, and my primary care provider/team.    Use your imagination  Be creative.  We all have a creative side; it doesn t matter if it s oil painting, sand castles, or mud pies! This will also kick up the endorphins.    Witness Beauty  (AKA stop and smell the roses) Take a look outside, even in mid-winter. Notice colors, textures. Watch the squirrels and birds.     Service to others  Be of service to others.  There is always someone else in need.  By helping others we can  get out of ourselves  and remember the really important things.  This also provides opportunities for practicing all the other parts of the program.    Humor  Laugh and be silly!  Adjust your TV habits for less news and crime-drama and more comedy.    Control your stress  Try breathing deep, massage therapy, biofeedback, and meditation. Find time to relax each day.     My support system    Clinic Contact:  Phone number:    Contact 1:  Phone number:    Contact 2:  Phone number:    Muslim/:  Phone number:    Therapist:  Phone number:    Local crisis center:    Phone number:    Other community support:  Phone number:

## 2018-03-06 NOTE — PATIENT INSTRUCTIONS
1. Please see me next wk with a calendar of what you actually took of the venlafaxine     Start with one a day  And increase as per the bottle depending on how anxious you are     2. Consider the implanon :  You could return to BCPs and control the periods better     If removal desired, need to go to GYN

## 2018-03-06 NOTE — MR AVS SNAPSHOT
After Visit Summary   3/6/2018    Tiffany Munoz    MRN: [id number]           Patient Information     Date Of Birth          1995        Visit Information        Provider Department      3/6/2018 1:20 PM Codie De La Vega MD [facility]        Today's Diagnoses     Moderate episode of recurrent major depressive disorder (H) issue of reinitiating Rx     -  1    Screening examination for venereal disease        Need for HPV vaccine        Need for prophylactic vaccination with tetanus-diphtheria (TD)        Need for vaccination          Care Instructions    1. Please see me next wk with a calendar of what you actually took of the venlafaxine     Start with one a day  And increase as per the bottle depending on how anxious you are     2. Consider the implanon :  You could return to BCPs and control the periods better     If removal desired, need to go to GYN           Follow-ups after your visit        Follow-up notes from your care team     Return in about 1 week (around 3/13/2018) for med chek.      Who to contact     If you have questions or need follow up information about today's clinic visit or your schedule please contact [facility] directly at [phone].  Normal or non-critical lab and imaging results will be communicated to you by MyChart, letter or phone within 4 business days after the clinic has received the results. If you do not hear from us within 7 days, please contact the clinic through MyChart or phone. If you have a critical or abnormal lab result, we will notify you by phone as soon as possible.  Submit refill requests through [employer] or call your pharmacy and they will forward the refill request to us. Please allow 3 business days for your refill to be completed.          Additional Information About Your Visit        MyChart Information     [employer] gives you secure access to your electronic health  "record. If you see a primary care provider, you can also send messages to your care team and make appointments. If you have questions, please call your primary care clinic.  If you do not have a primary care provider, please call [phone] and they will assist you.        Care EveryWhere ID     This is your Care EveryWhere ID. This could be used by other organizations to access your [facility] medical records  [id number]        Your Vitals Were     Pulse Temperature Respirations Height Last Period Pulse Oximetry    78 98.1  F (36.7  C) (Tympanic) 22 5' 10\" (1.778 m) (LMP Unknown) 98%    Breastfeeding? BMI (Body Mass Index)                No 23.1 kg/m2           Blood Pressure from Last 3 Encounters:   03/06/18 120/80   04/10/17 114/74   03/20/17 102/58    Weight from Last 3 Encounters:   03/06/18 161 lb (73 kg)   04/10/17 153 lb (69.4 kg)   03/20/17 150 lb (68 kg)              We Performed the Following     1st  Administration  [76133]     DEPRESSION ACTION PLAN (DAP)     TDAP VACCINE (ADACEL) [15934.002]          Today's Medication Changes          These changes are accurate as of 3/6/18  3:57 PM.  If you have any questions, ask your nurse or doctor.               Start taking these medicines.        Dose/Directions    venlafaxine 37.5 MG 24 hr capsule   Commonly known as:  EFFEXOR-XR   Used for:  Moderate episode of recurrent major depressive disorder (H)   Started by:  Codie De La Vega MD        Take one cap po a day for 2 days,  Then one bid for 4 days then 2 in the am and one in the pm for 4 d  , then 2 po bid   Quantity:  60 capsule   Refills:  0            Where to get your medicines      These medications were sent to [id number] - [place], [place] - [address] [facility]  [address] [facility] [address]     Phone:  [phone]     venlafaxine 37.5 MG 24 hr capsule                Primary Care Provider Office Phone # Fax #    Nicole Joy Siegler, PA-C [phone] " [phone]       [facility] [address]  [facility] [address]        Equal Access to Services     SETH BETTS : Hadii aad ku hadpoli Harris, wajewellda luqadaha, qaybta kaalmada josselyn, clemente remain hayaasammi augustesadi lackey laRejichance thompson. So [facility] [phone].    ATENCIÓN: Si habla español, tiene a vizcarra disposición servicios gratuitos de asistencia lingüística. Llame al [phone].    We comply with applicable federal civil rights laws and [place] laws. We do not discriminate on the basis of race, color, national origin, age, disability, sex, sexual orientation, or gender identity.            Thank you!     Thank you for choosing [facility]  for your care. Our goal is always to provide you with excellent care. Hearing back from our patients is one way we can continue to improve our services. Please take a few minutes to complete the written survey that you may receive in the mail after your visit with us. Thank you!             Your Updated Medication List - Protect others around you: Learn how to safely use, store and throw away your medicines at www.disposemymeds.org.          This list is accurate as of 3/6/18  3:57 PM.  Always use your most recent med list.                   Brand Name Dispense Instructions for use Diagnosis    etonogestrel 68 MG Impl    IMPLANON/NEXPLANON    1 each    1 each (68 mg) by Subdermal route once for 1 dose    Nexplanon insertion       venlafaxine 37.5 MG 24 hr capsule    EFFEXOR-XR    60 capsule    Take one cap po a day for 2 days,  Then one bid for 4 days then 2 in the am and one in the pm for 4 d  , then 2 po bid    Moderate episode of recurrent major depressive disorder (H)

## 2018-03-07 ASSESSMENT — PATIENT HEALTH QUESTIONNAIRE - PHQ9: SUM OF ALL RESPONSES TO PHQ QUESTIONS 1-9: 19

## 2018-03-07 ASSESSMENT — ANXIETY QUESTIONNAIRES: GAD7 TOTAL SCORE: 20

## 2018-03-16 ENCOUNTER — OFFICE VISIT (OUTPATIENT)
Dept: FAMILY MEDICINE | Facility: CLINIC | Age: 23
End: 2018-03-16
Payer: COMMERCIAL

## 2018-03-16 VITALS
SYSTOLIC BLOOD PRESSURE: 110 MMHG | HEART RATE: 80 BPM | BODY MASS INDEX: 22.9 KG/M2 | RESPIRATION RATE: 24 BRPM | OXYGEN SATURATION: 98 % | TEMPERATURE: 98.2 F | WEIGHT: 160 LBS | HEIGHT: 70 IN | DIASTOLIC BLOOD PRESSURE: 80 MMHG

## 2018-03-16 DIAGNOSIS — F99 INSOMNIA DUE TO OTHER MENTAL DISORDER: ICD-10-CM

## 2018-03-16 DIAGNOSIS — F33.1 MODERATE EPISODE OF RECURRENT MAJOR DEPRESSIVE DISORDER (H): Primary | ICD-10-CM

## 2018-03-16 DIAGNOSIS — F51.05 INSOMNIA DUE TO OTHER MENTAL DISORDER: ICD-10-CM

## 2018-03-16 DIAGNOSIS — N62 LARGE BREASTS: ICD-10-CM

## 2018-03-16 DIAGNOSIS — Z80.3 FAMILY HISTORY OF MALIGNANT NEOPLASM OF BREAST: ICD-10-CM

## 2018-03-16 DIAGNOSIS — F41.9 ANXIETY: ICD-10-CM

## 2018-03-16 DIAGNOSIS — N64.4 BREAST PAIN: ICD-10-CM

## 2018-03-16 PROCEDURE — 99214 OFFICE O/P EST MOD 30 MIN: CPT | Performed by: FAMILY MEDICINE

## 2018-03-16 RX ORDER — VENLAFAXINE HYDROCHLORIDE 75 MG/1
75 TABLET, EXTENDED RELEASE ORAL DAILY
Qty: 60 TABLET | Refills: 0 | Status: SHIPPED | OUTPATIENT
Start: 2018-03-16 | End: 2018-05-04

## 2018-03-16 ASSESSMENT — ANXIETY QUESTIONNAIRES
2. NOT BEING ABLE TO STOP OR CONTROL WORRYING: MORE THAN HALF THE DAYS
1. FEELING NERVOUS, ANXIOUS, OR ON EDGE: MORE THAN HALF THE DAYS
6. BECOMING EASILY ANNOYED OR IRRITABLE: NOT AT ALL
IF YOU CHECKED OFF ANY PROBLEMS ON THIS QUESTIONNAIRE, HOW DIFFICULT HAVE THESE PROBLEMS MADE IT FOR YOU TO DO YOUR WORK, TAKE CARE OF THINGS AT HOME, OR GET ALONG WITH OTHER PEOPLE: NOT DIFFICULT AT ALL
3. WORRYING TOO MUCH ABOUT DIFFERENT THINGS: SEVERAL DAYS
GAD7 TOTAL SCORE: 6
7. FEELING AFRAID AS IF SOMETHING AWFUL MIGHT HAPPEN: NOT AT ALL
5. BEING SO RESTLESS THAT IT IS HARD TO SIT STILL: NOT AT ALL

## 2018-03-16 ASSESSMENT — PATIENT HEALTH QUESTIONNAIRE - PHQ9: 5. POOR APPETITE OR OVEREATING: SEVERAL DAYS

## 2018-03-16 NOTE — PROGRESS NOTES
SUBJECTIVE:   Tiffany Munoz is a 22 year old female who presents to clinic today for the following health issues:    Depression and Anxiety Follow-Up      Status since last visit: depression feels  better on effexor 37.5mgm bid ( was on 75 bid in past )     But anxiety feels worse despite the much improved NGA score     \onset teens     On meds 3 x for this in lifetime     Other associated symptoms:None    Complicating factors:     Significant life event: No     Current substance abuse: None    PHQ-9 5/18/2016 12/14/2016 3/6/2018   Total Score 3 12 19   Q9: Suicide Ideation Not at all Not at all Several days     NGA-7 SCORE 5/18/2016 2/28/2017 3/6/2018   Total Score 13 18 20       PHQ-9  English=9  PHQ-9   Any Language  NGA-7=6  Suicide Assessment Five-step Evaluation and Treatment (SAFE-T)      Amount of exercise or physical activity: 4-5 days/week for an average of greater than 60 minutes    Problems taking medications regularly: No    Medication side effects: none    Diet: regular (no restrictions)    Insomnia      Duration: lifelong but worse with the worse depression the last mos & perhaps a little worse on effexor which she takes at 6 pm  - 4 hr prior to HS    Description  Frequency of insomnia:  nightly  Time to fall asleep: 1 hour  Middle of night awakening:  nightly  Early morning awakening:  nightly    Accompanying signs and symptoms:  depression/mood changes    History  Similar episodes in past:  YES  Previous evaluation/sleep study:  no     Precipitating or alleviating factors:  New stressful situation: no   Caffeine intake after lunchtime: YES- coffee 1-2 C & Red bULL Q 2-3 D--STARTED  2-3 mo ago   OTC decongestants: no   Any new medications: YES- EFFEXOR START 3-6-18     Therapies tried and outcome: none    BREAST PAIN/TENDERNESS/ INCREASED SIZE       Duration: worsening since onset at 14 y/o , but especially the last 3 mo     Description (location/character/radiation): whole breast bilat with  "L>R     Intensity:  moderate, 3/10    Accompanying signs and symptoms: 0    History (similar episodes/previous evaluation): None    Precipitating or alleviating factors: None    Therapies tried and outcome: None    fam hx breast ca in mat& pat gmas     BC = implanon since 3-17     No LMP --only spotting sporadically     No palpable mass-just cystic     Para 0000    Caffeine 3-4 + C coffee /d & REd Bull q 2-3 d ( used to be 3 x this )        Problem list and histories reviewed & adjusted, as indicated.  Additional history: as documented    Labs reviewed in EPIC    Reviewed and updated as needed this visit by clinical staff  Tobacco  Allergies  Meds  Med Hx  Surg Hx  Fam Hx  Soc Hx      Reviewed and updated as needed this visit by Provider         ROS:  CONSTITUTIONAL: NEGATIVE for fever, chills, change in weight  INTEGUMENTARY/SKIN: NEGATIVE for worrisome rashes, moles or lesions  EYES: NEGATIVE for vision changes or irritation  ENT/MOUTH: NEGATIVE for ear, mouth and throat problems  RESP: NEGATIVE for significant cough or SOB  BREAST: POSITIVE for bilat  pain   CV: NEGATIVE for chest pain, palpitations or peripheral edema  GI: NEGATIVE for nausea, abdominal pain, heartburn, or change in bowel habits  : NEGATIVE for frequency, dysuria, or hematuria  MUSCULOSKELETAL: NEGATIVE for significant arthralgias or myalgia  NEURO: NEGATIVE for weakness, dizziness or paresthesias  ENDOCRINE: NEGATIVE for temperature intolerance, skin/hair changes  HEME: NEGATIVE for bleeding problems  PSYCHIATRIC: POSITIVE for, anxiety, concentration difficulty, depressed mood, HX anxiety, HX depression, fatigue and insomnia     OBJECTIVE:     /80  Pulse 80  Temp 98.2  F (36.8  C) (Tympanic)  Resp 24  Ht 5' 10\" (1.778 m)  Wt 160 lb (72.6 kg)  LMP  (LMP Unknown)  SpO2 98%  Breastfeeding? No  BMI 22.96 kg/m2  Body mass index is 22.96 kg/(m^2).  GENERAL: healthy, alert and no distress  EYES: Eyes grossly normal to " inspection, PERRL and conjunctivae and sclerae normal  RESP: lungs clear to auscultation - no rales, rhonchi or wheezes  BREAST: normal without masses, tenderness or nipple discharge and no palpable axillary masses or adenopathy-large , cystic, slightly tender thruout   CV: regular rate and rhythm, normal S1 S2, no S3 or S4, no murmur, click or rub, no peripheral edema and peripheral pulses strong  MS: no gross musculoskeletal defects noted, no edema  SKIN: no suspicious lesions or rashes  NEURO: Normal strength and tone, mentation intact and speech normal  PSYCH: mentation appears normal, affect normal/bright    Diagnostic Test Results:  none     ASSESSMENT/PLAN:               ICD-10-CM    1. Moderate episode of recurrent major depressive disorder (H) onset teens-issue of increasing meds F33.1 venlafaxine (EFFEXOR-ER) 75 MG TB24 24 hr tablet   2. Anxiety F41.9 venlafaxine (EFFEXOR-ER) 75 MG TB24 24 hr tablet   3. Insomnia due to other mental disorder F51.05     F99    4. Breast pain-bilat-L>R since 13y/o  N64.4    5. Large breasts N62    6. Family history of malignant neoplasm of breast-pat & mat grdma  Z80.3        Patient Instructions   1. Explained that the brain changes structurally when you go on and off meds for depression / anxiety     2. Increase the venlafaxine to 75mgm bid --new Rx   Take the remaining 37.5 mgm  At 2 in am and one in pm  Take the pm one a little earlier      3. This may help the sleep , if not will start sleep med     See me in 2 weeks    Get outside and exercise !!!!!     Wear a firm support bra     Stop the caffeine --will do a trial for 2 weeks and I see her again then  Depending on results, if still pain and concerns, would refer to breast surgeon eg Lena Jeter .....    Explained that mammo increases risk of breast ca at a young age     Please do your breast exam every mo, when you  Change the  calendar page or set an alarm on your cell phone Do a  visual check for dimples,  inversion or indentation or any different position of the nipple Feel manually  for any 1cm or larger  size mass ie about the size of an almond Be sure to cover the entire area of both breasts : this extends back to the back on either side and from the collar bone to the bottom of the breasts where you can begin to feel ribs.        Codie De La Vega MD  [facility]

## 2018-03-16 NOTE — NURSING NOTE
"Chief Complaint   Patient presents with     Recheck Medication     /80  Pulse 80  Temp 98.2  F (36.8  C) (Tympanic)  Resp 24  Ht 5' 10\" (1.778 m)  Wt 160 lb (72.6 kg)  LMP  (LMP Unknown)  SpO2 98%  Breastfeeding? No  BMI 22.96 kg/m2 Estimated body mass index is 22.96 kg/(m^2) as calculated from the following:    Height as of this encounter: 5' 10\" (1.778 m).    Weight as of this encounter: 160 lb (72.6 kg).  BP completed using cuff size: regular   Gretchen Herrera CMA    Health Maintenance Due   Topic Date Due     HPV IMMUNIZATION (2 of 2 - Female 2 Dose Series) 11/02/2007     CHLAMYDIA SCREENING  03/20/2018     NGA QUESTIONNAIRE 1 MONTH  04/06/2018     Health Maintenance reviewed at today's visit patient asked to schedule/complete:   Chlamydia:  Patient agrees to schedule  Depression:  Patient agrees to schedule  Immunizations:  Patient agrees to schedule    "

## 2018-03-16 NOTE — PATIENT INSTRUCTIONS
1. Explained that the brain changes structurally when you go on and off meds for depression / anxiety     2. Increase the venlafaxine to 75mgm bid --new Rx   Take the remaining 37.5 mgm  At 2 in am and one in pm  Take the pm one a little earlier      3. This may help the sleep , if not will start sleep med     See me in 2 weeks    Get outside and exercise !!!!!     Wear a firm support bra     Stop the caffeine     Explained that mammo increases risk of breast ca at a young age     Please do your breast exam every mo, when you  Change the  calendar page or set an alarm on your cell phone Do a  visual check for dimples, inversion or indentation or any different position of the nipple Feel manually  for any 1cm or larger  size mass ie about the size of an almond Be sure to cover the entire area of both breasts : this extends back to the back on either side and from the collar bone to the bottom of the breasts where you can begin to feel ribs.

## 2018-03-16 NOTE — MR AVS SNAPSHOT
After Visit Summary   3/16/2018    Tiffany Munoz    MRN: [id number]           Patient Information     Date Of Birth          1995        Visit Information        Provider Department      3/16/2018 9:00 AM Codie De La Vega MD [facility]        Today's Diagnoses     Moderate episode of recurrent major depressive disorder (H) onset teens-issue of increasing meds    -  1    Anxiety        Insomnia due to other mental disorder        Breast pain-bilat-L>R since 13y/o         Family history of malignant neoplasm of breast-pat & mat grdma           Care Instructions    1. Explained that the brain changes structurally when you go on and off meds for depression / anxiety     2. Increase the venlafaxine to 75mgm bid --new Rx   Take the remaining 37.5 mgm  At 2 in am and one in pm  Take the pm one a little earlier      3. This may help the sleep , if not will start sleep med     See me in 2 weeks    Get outside and exercise !!!!!     Wear a firm support bra     Stop the caffeine     Explained that mammo increases risk of breast ca at a young age     Please do your breast exam every mo, when you  Change the  calendar page or set an alarm on your cell phone Do a  visual check for dimples, inversion or indentation or any different position of the nipple Feel manually  for any 1cm or larger  size mass ie about the size of an almond Be sure to cover the entire area of both breasts : this extends back to the back on either side and from the collar bone to the bottom of the breasts where you can begin to feel ribs.            Follow-ups after your visit        Follow-up notes from your care team     Return in about 2 weeks (around 3/30/2018), or med chek, for med chek .      Who to contact     If you have questions or need follow up information about today's clinic visit or your schedule please contact [facility] directly at  "[phone].  Normal or non-critical lab and imaging results will be communicated to you by MyChart, letter or phone within 4 business days after the clinic has received the results. If you do not hear from us within 7 days, please contact the clinic through [employer] or phone. If you have a critical or abnormal lab result, we will notify you by phone as soon as possible.  Submit refill requests through [facility] [employer] or call your pharmacy and they will forward the refill request to us. Please allow 3 business days for your refill to be completed.          Additional Information About Your Visit        [employer] Information     [facility] gives you secure access to your electronic health record. If you see a primary care provider, you can also send messages to your care team and make appointments. If you have questions, please call your primary care clinic.  If you do not have a primary care provider, please call [phone] and they will assist you.        Care EveryWhere ID     This is your Care EveryWhere ID. This could be used by other organizations to access your [place] medical records  [id number]        Your Vitals Were     Pulse Temperature Respirations Height Last Period Pulse Oximetry    80 98.2  F (36.8  C) (Tympanic) 24 5' 10\" (1.778 m) (LMP Unknown) 98%    Breastfeeding? BMI (Body Mass Index)                No 22.96 kg/m2           Blood Pressure from Last 3 Encounters:   03/16/18 110/80   03/06/18 120/80   04/10/17 114/74    Weight from Last 3 Encounters:   03/16/18 160 lb (72.6 kg)   03/06/18 161 lb (73 kg)   04/10/17 153 lb (69.4 kg)              Today, you had the following     No orders found for display         Today's Medication Changes          These changes are accurate as of 3/16/18  9:46 AM.  If you have any questions, ask your nurse or doctor.               Start taking these medicines.        Dose/Directions    venlafaxine 75 MG Tb24 24 hr tablet   Commonly known as:  EFFEXOR-ER   Used for:  " Moderate episode of recurrent major depressive disorder (H), Anxiety   Replaces:  venlafaxine 37.5 MG 24 hr capsule   Started by:  Codie De La Vega MD        Dose:  75 mg   Take 1 tablet (75 mg) by mouth daily Two times a day   Quantity:  60 tablet   Refills:  0         Stop taking these medicines if you haven't already. Please contact your care team if you have questions.     venlafaxine 37.5 MG 24 hr capsule   Commonly known as:  EFFEXOR-XR   Replaced by:  venlafaxine 75 MG Tb24 24 hr tablet   Stopped by:  Codie De La Vega MD                Where to get your medicines      These medications were sent to [id number] [address] [facility]  [address] [facility] [address]     Phone:  [phone]     venlafaxine 75 MG Tb24 24 hr tablet                Primary Care Provider Office Phone # Fax #    Codie De La Vega [phone] [address] [facility] [address]        Equal Access to Services     [facility]: Hadii aad ku hadasho Soomaali, waaxda luqadaha, qaybta kaalmada adeegyada, waxay elvis lowe . So [facility] [phone].    ATENCIÓN: Si habla español, tiene a vizcarra disposición servicios gratuitos de asistencia lingüística. Llame al [phone].    We comply with applicable federal civil rights laws and [place] laws. We do not discriminate on the basis of race, color, national origin, age, disability, sex, sexual orientation, or gender identity.            Thank you!     Thank you for choosing [facility]  for your care. Our goal is always to provide you with excellent care. Hearing back from our patients is one way we can continue to improve our services. Please take a few minutes to complete the written survey that you may receive in the mail after your visit with us. Thank you!             Your Updated Medication List - Protect others around you: Learn how  to safely use, store and throw away your medicines at www.disposemymeds.org.          This list is accurate as of 3/16/18  9:46 AM.  Always use your most recent med list.                   Brand Name Dispense Instructions for use Diagnosis    etonogestrel 68 MG Impl    IMPLANON/NEXPLANON    1 each    1 each (68 mg) by Subdermal route once for 1 dose    Nexplanon insertion       venlafaxine 75 MG Tb24 24 hr tablet    EFFEXOR-ER    60 tablet    Take 1 tablet (75 mg) by mouth daily Two times a day    Moderate episode of recurrent major depressive disorder (H), Anxiety

## 2018-03-17 ASSESSMENT — ANXIETY QUESTIONNAIRES: GAD7 TOTAL SCORE: 6

## 2018-03-17 ASSESSMENT — PATIENT HEALTH QUESTIONNAIRE - PHQ9: SUM OF ALL RESPONSES TO PHQ QUESTIONS 1-9: 9

## 2018-04-19 ENCOUNTER — TELEPHONE (OUTPATIENT)
Dept: FAMILY MEDICINE | Facility: CLINIC | Age: 23
End: 2018-04-19

## 2018-04-19 NOTE — LETTER
April 19, 2018    Tiffany Munoz  [address]  [place] [address]    Dear Cesar Allen cares about your health and your health plan.  I have reviewed your medical conditions, medication list and lab results, and am making recommendations based on this review to better manage your health.    You are in particular need of attention regarding:  -Depression/Anxiety    I am recommending that you:     Please complete the enclosed PHQ9 and mail back to clinic in the envelope provided.         Please call us at the [facility] location:  [phone] or use [employer] to address the above recommendations.     Thank you for trusting [facility].  We appreciate the opportunity to serve you and look forward to supporting your healthcare in the future.    If you have (or plan to have) any of these tests done at a facility other than a [facility] or a [facility], please have the results sent to the [facility] location noted above.      Best Regards,    Codie De La Vega MD

## 2018-04-19 NOTE — TELEPHONE ENCOUNTER
Panel Management Review      Patient has the following on her problem list:     Depression / Dysthymia review    Measure:  Needs PHQ-9 score of 4 or less during index window.  Administer PHQ-9 and if score is 5 or more, send encounter to provider for next steps.    5   7 month window range:     PHQ-9 SCORE 12/14/2016 3/6/2018 3/16/2018   Total Score - - -   Total Score 12 19 9       If PHQ-9 recheck is 5 or more, route to provider for next steps.    Patient is due for:  PHQ9      Composite cancer screening  Chart review shows that this patient is due/due soon for the following None  Summary:    Patient is due/failing the following:   PHQ9    Action needed:   Patient needs to do PHQ9.    Type of outreach:    Sent letter.    Questions for provider review:    None                                                                                                                                    Gretchen Herrera CMA       Chart routed to  .

## 2018-05-01 ASSESSMENT — ANXIETY QUESTIONNAIRES
GAD7 TOTAL SCORE: 13
1. FEELING NERVOUS, ANXIOUS, OR ON EDGE: SEVERAL DAYS
2. NOT BEING ABLE TO STOP OR CONTROL WORRYING: SEVERAL DAYS
5. BEING SO RESTLESS THAT IT IS HARD TO SIT STILL: MORE THAN HALF THE DAYS
6. BECOMING EASILY ANNOYED OR IRRITABLE: MORE THAN HALF THE DAYS
IF YOU CHECKED OFF ANY PROBLEMS ON THIS QUESTIONNAIRE, HOW DIFFICULT HAVE THESE PROBLEMS MADE IT FOR YOU TO DO YOUR WORK, TAKE CARE OF THINGS AT HOME, OR GET ALONG WITH OTHER PEOPLE: SOMEWHAT DIFFICULT
7. FEELING AFRAID AS IF SOMETHING AWFUL MIGHT HAPPEN: MORE THAN HALF THE DAYS
3. WORRYING TOO MUCH ABOUT DIFFERENT THINGS: NEARLY EVERY DAY

## 2018-05-01 ASSESSMENT — PATIENT HEALTH QUESTIONNAIRE - PHQ9: 5. POOR APPETITE OR OVEREATING: MORE THAN HALF THE DAYS

## 2018-05-02 DIAGNOSIS — F41.9 ANXIETY: ICD-10-CM

## 2018-05-02 DIAGNOSIS — F33.1 MODERATE EPISODE OF RECURRENT MAJOR DEPRESSIVE DISORDER (H): ICD-10-CM

## 2018-05-02 ASSESSMENT — ANXIETY QUESTIONNAIRES: GAD7 TOTAL SCORE: 13

## 2018-05-02 ASSESSMENT — PATIENT HEALTH QUESTIONNAIRE - PHQ9: SUM OF ALL RESPONSES TO PHQ QUESTIONS 1-9: 16

## 2018-05-02 NOTE — TELEPHONE ENCOUNTER
Reason for Call:  Medication or medication refill:    Do you use a [facility]?  Name of the pharmacy and phone number for the current request:  [facility]    Name of the medication requested:venlafaxine (EFFEXOR-ER) 75 MG TB24 24 hr tablet    Other request: none    Can we leave a detailed message on this number? YES    Phone number patient can be reached at: Home number on file [phone] (home)    Best Time: any    Call taken on 5/2/2018 at 1:56 PM by SONALI LANDON

## 2018-05-02 NOTE — TELEPHONE ENCOUNTER
Patient was called to schedule appointment for follow up, there was no answer and no option to leave a VM. Will call back later.

## 2018-05-03 RX ORDER — VENLAFAXINE HYDROCHLORIDE 75 MG/1
75 TABLET, EXTENDED RELEASE ORAL DAILY
Qty: 60 TABLET | Refills: 0 | OUTPATIENT
Start: 2018-05-03

## 2018-05-03 NOTE — TELEPHONE ENCOUNTER
"Requested Prescriptions   Pending Prescriptions Disp Refills     venlafaxine (EFFEXOR-ER) 75 MG TB24 24 hr tablet 60 tablet 0    Last Written Prescription Date:  03/16/2018  Last Fill Quantity: 60,  # refills: 0   Last office visit: 3/16/2018 with prescribing provider:  Dr De La Vega   Future Office Visit:   Sig: Take 1 tablet (75 mg) by mouth daily Two times a day    Serotonin-Norepinephrine Reuptake Inhibitors  Failed    5/2/2018  1:56 PM       Failed - PHQ-9 score of less than 5 in past 6 months    Please review last PHQ-9 score.          Failed - Normal serum creatinine on file in past 12 months    Recent Labs   Lab Test  03/16/17   1905   CR  0.66            Passed - Blood pressure under 140/90 in past 12 months    BP Readings from Last 3 Encounters:   03/16/18 110/80   03/06/18 120/80   04/10/17 114/74                Passed - Patient is age 18 or older       Passed - No active pregnancy on record       Passed - No positive pregnancy test in past 12 months       Passed - Recent (6 mo) or future (30 days) visit within the authorizing provider's specialty    Patient had office visit in the last 6 months or has a visit in the next 30 days with authorizing provider or within the authorizing provider's specialty.  See \"Patient Info\" tab in inbasket, or \"Choose Columns\" in Meds & Orders section of the refill encounter.            "

## 2018-05-03 NOTE — TELEPHONE ENCOUNTER
Routing refill request to provider for review/approval because:  PHQ9 is >5 protocol failed.

## 2018-05-04 ENCOUNTER — OFFICE VISIT (OUTPATIENT)
Dept: FAMILY MEDICINE | Facility: CLINIC | Age: 23
End: 2018-05-04
Payer: COMMERCIAL

## 2018-05-04 VITALS
OXYGEN SATURATION: 100 % | HEART RATE: 64 BPM | BODY MASS INDEX: 22.83 KG/M2 | WEIGHT: 159.5 LBS | SYSTOLIC BLOOD PRESSURE: 108 MMHG | HEIGHT: 70 IN | DIASTOLIC BLOOD PRESSURE: 62 MMHG

## 2018-05-04 DIAGNOSIS — F41.9 ANXIETY: ICD-10-CM

## 2018-05-04 DIAGNOSIS — F33.1 MODERATE EPISODE OF RECURRENT MAJOR DEPRESSIVE DISORDER (H): Primary | ICD-10-CM

## 2018-05-04 DIAGNOSIS — G47.00 INSOMNIA, UNSPECIFIED TYPE: ICD-10-CM

## 2018-05-04 PROCEDURE — 99213 OFFICE O/P EST LOW 20 MIN: CPT | Performed by: FAMILY MEDICINE

## 2018-05-04 RX ORDER — VENLAFAXINE HYDROCHLORIDE 75 MG/1
75 TABLET, EXTENDED RELEASE ORAL DAILY
Qty: 60 TABLET | Refills: 2 | Status: SHIPPED | OUTPATIENT
Start: 2018-05-04 | End: 2020-07-02

## 2018-05-04 ASSESSMENT — PATIENT HEALTH QUESTIONNAIRE - PHQ9: 5. POOR APPETITE OR OVEREATING: NEARLY EVERY DAY

## 2018-05-04 ASSESSMENT — ANXIETY QUESTIONNAIRES
GAD7 TOTAL SCORE: 14
5. BEING SO RESTLESS THAT IT IS HARD TO SIT STILL: NEARLY EVERY DAY
1. FEELING NERVOUS, ANXIOUS, OR ON EDGE: SEVERAL DAYS
6. BECOMING EASILY ANNOYED OR IRRITABLE: MORE THAN HALF THE DAYS
2. NOT BEING ABLE TO STOP OR CONTROL WORRYING: SEVERAL DAYS
3. WORRYING TOO MUCH ABOUT DIFFERENT THINGS: MORE THAN HALF THE DAYS
7. FEELING AFRAID AS IF SOMETHING AWFUL MIGHT HAPPEN: MORE THAN HALF THE DAYS

## 2018-05-04 NOTE — PATIENT INSTRUCTIONS
"Tips for Sleep Hygiene  \"Sleep hygiene\" means having good sleep habits.Follow these tips to sleep better at night:     Get on a schedule. Go to bed and get up at about the same time every day.    Listen to your body. Only try to sleep when you actually feel tired or sleepy.    Be patient. If you haven't been able to get to sleep after about 30 minutes or more, get up and do something calming or boring until you feel sleepy. Then return to bed and try again.    Don't have caffeine (coffee, tea, cola drinks, chocolate and some medicines), alcohol or nicotine (cigarettes). These can make it harder for you to fall asleep and stay asleep.    Use your bed for sleeping only. That means no TV, computer or homework in bed, especially during the evening and before bedtime.    Don't nap during the day. If you must nap, make sure it is for less than 20 minutes.    Create sleep rituals that remind your body it is time to sleep. Examples include breathing exercises, stretching or reading a book.    Avoid all electronic media (smart phone, computer, tablet) within 2 hours of bed time. The \"blue light\" in these devices activates the part of the brain that keeps you awake.    Dim the lights at night.    Get early morning sources of light (walk in the sunshine) to help set sleep patterns at night.    Try a bath or shower before bed. Having a warm bath 1 to 2 hours before bedtime can help you feel sleepy. Hot baths can make you alert, so be mindful of the temperature.    Don't watch the clock. Checking the clock during the night can wake you up. It can also lead to negative thoughts such as, \"I will never fall asleep,\" which can increase anxiety and sleeplessness.    Use a sleep diary. Track your sleep schedule to know your sleep patterns and to see where you can improve.    Get regular exercise every day. Try not to do heavy exercise in the 4 hours before bedtime.    Eat a healthy, balanced diet.    Try eating a light, healthy snack " before bed, but avoid eating a heavy meal.    Create the right sleeping area. A cool, dark, quiet room is best. If needed, try earplugs, fans and blackout curtains.    Keep your daytime routine the same even if you have a bad night sleep. Avoiding activities the next day can make it harder to sleep.  For informational purposes only. Not to replace the advice of your health care provider.   Copyright   2013 [facility]. All rights reserved. [employer] [id number]   01/16.

## 2018-05-04 NOTE — MR AVS SNAPSHOT
"              After Visit Summary   5/4/2018    Tiffany Munoz    MRN: [id number]           Patient Information     Date Of Birth          1995        Visit Information        Provider Department      5/4/2018 9:50 AM Kaylee Rasheed DO [facility]        Today's Diagnoses     Moderate episode of recurrent major depressive disorder (H) onset teens     -  1    Anxiety        Insomnia, unspecified type        Moderate episode of recurrent major depressive disorder (H) onset teens-issue of increasing meds          Care Instructions    Tips for Sleep Hygiene  \"Sleep hygiene\" means having good sleep habits.Follow these tips to sleep better at night:     Get on a schedule. Go to bed and get up at about the same time every day.    Listen to your body. Only try to sleep when you actually feel tired or sleepy.    Be patient. If you haven't been able to get to sleep after about 30 minutes or more, get up and do something calming or boring until you feel sleepy. Then return to bed and try again.    Don't have caffeine (coffee, tea, cola drinks, chocolate and some medicines), alcohol or nicotine (cigarettes). These can make it harder for you to fall asleep and stay asleep.    Use your bed for sleeping only. That means no TV, computer or homework in bed, especially during the evening and before bedtime.    Don't nap during the day. If you must nap, make sure it is for less than 20 minutes.    Create sleep rituals that remind your body it is time to sleep. Examples include breathing exercises, stretching or reading a book.    Avoid all electronic media (smart phone, computer, tablet) within 2 hours of bed time. The \"blue light\" in these devices activates the part of the brain that keeps you awake.    Dim the lights at night.    Get early morning sources of light (walk in the sunshine) to help set sleep patterns at night.    Try a bath or shower before bed. Having a warm bath 1 to 2 " "hours before bedtime can help you feel sleepy. Hot baths can make you alert, so be mindful of the temperature.    Don't watch the clock. Checking the clock during the night can wake you up. It can also lead to negative thoughts such as, \"I will never fall asleep,\" which can increase anxiety and sleeplessness.    Use a sleep diary. Track your sleep schedule to know your sleep patterns and to see where you can improve.    Get regular exercise every day. Try not to do heavy exercise in the 4 hours before bedtime.    Eat a healthy, balanced diet.    Try eating a light, healthy snack before bed, but avoid eating a heavy meal.    Create the right sleeping area. A cool, dark, quiet room is best. If needed, try earplugs, fans and blackout curtains.    Keep your daytime routine the same even if you have a bad night sleep. Avoiding activities the next day can make it harder to sleep.  For informational purposes only. Not to replace the advice of your health care provider.   Copyright   2013 [facility]. All rights reserved. [employer] 330877 - 01/16.            Follow-ups after your visit        Follow-up notes from your care team     Return for Routine Visit.      Who to contact     If you have questions or need follow up information about today's clinic visit or your schedule please contact [facility] directly at [phone].  Normal or non-critical lab and imaging results will be communicated to you by MyChart, letter or phone within 4 business days after the clinic has received the results. If you do not hear from us within 7 days, please contact the clinic through [email or web address] or phone. If you have a critical or abnormal lab result, we will notify you by phone as soon as possible.  Submit refill requests through [employer] or call your pharmacy and they will forward the refill request to us. Please allow 3 business days for your refill to be completed.          Additional Information " "About Your Visit        MyChart Information     [employer] gives you secure access to your electronic health record. If you see a primary care provider, you can also send messages to your care team and make appointments. If you have questions, please call your primary care clinic.  If you do not have a primary care provider, please call [phone] and they will assist you.        Care EveryWhere ID     This is your Care EveryWhere ID. This could be used by other organizations to access your [facility] medical records  [id number]        Your Vitals Were     Pulse Height Pulse Oximetry Breastfeeding? BMI (Body Mass Index)       64 5' 10\" (1.778 m) 100% No 22.89 kg/m2        Blood Pressure from Last 3 Encounters:   05/04/18 108/62   03/16/18 110/80   03/06/18 120/80    Weight from Last 3 Encounters:   05/04/18 159 lb 8 oz (72.3 kg)   03/16/18 160 lb (72.6 kg)   03/06/18 161 lb (73 kg)              Today, you had the following     No orders found for display         Where to get your medicines      These [id number] [place] [place] [facility] [address] [address]  [address] [facility] [address]     Phone:  [phone]     venlafaxine 75 MG Tb24 24 hr tablet          Primary Care Provider Office Phone # Fax #    Codie Diana De La Vega [phone]       [address] [facility] [address]        Equal Access to Services     SETH BETTS AH: Hadii aad ku hadasho Soomaali, waaxda luqadaha, qaybta kaalmada adeegyada, clemente thompson. So [facility] [phone].    ATENCIÓN: Si habla español, tiene a vizcarra disposición servicios gratuitos de asistencia lingüística. Llame al [phone].    We comply with applicable federal civil rights laws and [place] laws. We do not discriminate on the basis of race, color, national origin, age, disability, sex, sexual orientation, or gender identity.            Thank you!     Thank you for choosing " [facility]  for your care. Our goal is always to provide you with excellent care. Hearing back from our patients is one way we can continue to improve our services. Please take a few minutes to complete the written survey that you may receive in the mail after your visit with us. Thank you!             Your Updated Medication List - Protect others around you: Learn how to safely use, store and throw away your medicines at www.disposemymeds.org.          This list is accurate as of 5/4/18 10:14 AM.  Always use your most recent med list.                   Brand Name Dispense Instructions for use Diagnosis    etonogestrel 68 MG Impl    IMPLANON/NEXPLANON    1 each    1 each (68 mg) by Subdermal route once for 1 dose    Nexplanon insertion       venlafaxine 75 MG Tb24 24 hr tablet    EFFEXOR-ER    60 tablet    Take 1 tablet (75 mg) by mouth daily Two times a day    Moderate episode of recurrent major depressive disorder (H), Anxiety

## 2018-05-04 NOTE — PROGRESS NOTES
SUBJECTIVE:   Tiffany Munoz is a 22 year old female who presents to clinic today for the following health issues:    Depression Followup    Status since last visit: Stable     See PHQ-9 for current symptoms.  Other associated symptoms: None    Complicating factors:   Significant life event:  No   Current substance abuse:  None  Anxiety or Panic symptoms:  Yes-      PHQ-9 3/16/2018 5/1/2018 5/4/2018   Total Score 9 16 13   Q9: Suicide Ideation Not at all Not at all Not at all       PHQ-9  English  PHQ-9   Any Language  Suicide Assessment Five-step Evaluation and Treatment (SAFE-T)    Amount of exercise or physical activity: 4-5 days/week for an average of greater than 60 minutes    Problems taking medications regularly: No    Medication side effects: none    Diet: regular (no restrictions)      Issues with falling asleep/staying asleep.    Has been on Effexor at 75 mg BID since last March 2018.  Just re-started Effexor about 2-3 months ago (slowly increasing the dose) after being off of it for a while.      Problem list and histories reviewed & adjusted, as indicated.  Additional history: as documented    Labs reviewed in EPIC    Reviewed and updated as needed this visit by clinical staff  Tobacco  Allergies  Meds  Problems  Med Hx  Surg Hx  Fam Hx  Soc Hx        Reviewed and updated as needed this visit by Provider  Allergies  Meds  Problems         ROS:  CONSTITUTIONAL: NEGATIVE for fever, chills, change in weight  INTEGUMENTARY/SKIN: NEGATIVE for worrisome rashes, moles or lesions  EYES: NEGATIVE for vision changes or irritation  ENT/MOUTH: NEGATIVE for ear, mouth and throat problems  RESP: NEGATIVE for significant cough or SOB  CV: NEGATIVE for chest pain, palpitations or peripheral edema  GI: NEGATIVE for nausea, abdominal pain, heartburn, or change in bowel habits  MUSCULOSKELETAL: NEGATIVE for significant arthralgias or myalgia    OBJECTIVE:     /62 (BP Location: Left arm, Patient  "Position: Chair, Cuff Size: Adult Regular)  Pulse 64  Ht 5' 10\" (1.778 m)  Wt 159 lb 8 oz (72.3 kg)  SpO2 100%  Breastfeeding? No  BMI 22.89 kg/m2  Body mass index is 22.89 kg/(m^2).   GENERAL: healthy, alert and no distress  EYES: Eyes grossly normal to inspection, PERRL and conjunctivae and sclerae normal  HENT: ear canals and TM's normal, nose and mouth without ulcers or lesions  NECK: no adenopathy, no asymmetry, masses, or scars and thyroid normal to palpation  RESP: lungs clear to auscultation - no rales, rhonchi or wheezes  CV: regular rate and rhythm, normal S1 S2, no S3 or S4, no murmur, click or rub, no peripheral edema and peripheral pulses strong  MS: no gross musculoskeletal defects noted, no edema  NEURO: Normal strength and tone, mentation intact and speech normal  PSYCH: mentation appears normal, affect normal/bright    Diagnostic Test Results:  none     ASSESSMENT/PLAN:     Problem List Items Addressed This Visit     Anxiety    Relevant Medications    venlafaxine (EFFEXOR-ER) 75 MG TB24 24 hr tablet    Moderate episode of recurrent major depressive disorder (H) onset teens  - Primary    Relevant Medications    venlafaxine (EFFEXOR-ER) 75 MG TB24 24 hr tablet      Other Visit Diagnoses     Insomnia, unspecified type             Mood generally more stable and better while on Effexor.  --Refilled Effexor and requesting same dose of 75 mg po BID (out of medication over past two days)  --Discussed sleep hygiene and provided handout.  Will try not to work-out too late at night.  --Discussed that she may consider adding Gabapentin or hydroxyzine to help with anxiety/insomnia.  Discussed referral to see a counselor for mood and sleep medicine for insomnia as well.  --Follow-up recommended for yearly preventive exams or sooner for an acute issues.      Kaylee Rasheed, DO  [facility]"

## 2018-05-05 ASSESSMENT — ANXIETY QUESTIONNAIRES: GAD7 TOTAL SCORE: 14

## 2018-05-05 ASSESSMENT — PATIENT HEALTH QUESTIONNAIRE - PHQ9: SUM OF ALL RESPONSES TO PHQ QUESTIONS 1-9: 13

## 2018-05-15 ENCOUNTER — MYC MEDICAL ADVICE (OUTPATIENT)
Dept: FAMILY MEDICINE | Facility: CLINIC | Age: 23
End: 2018-05-15

## 2018-05-15 NOTE — TELEPHONE ENCOUNTER
Called patient again, no answer and no way to leave VM. Sent pt mychart message asking her to schedule follow up appointment as we cannot reach her by phone.

## 2018-09-18 ENCOUNTER — OFFICE VISIT (OUTPATIENT)
Dept: MIDWIFE SERVICES | Facility: CLINIC | Age: 23
End: 2018-09-18
Payer: COMMERCIAL

## 2018-09-18 VITALS
SYSTOLIC BLOOD PRESSURE: 112 MMHG | WEIGHT: 164 LBS | BODY MASS INDEX: 23.48 KG/M2 | HEART RATE: 64 BPM | HEIGHT: 70 IN | DIASTOLIC BLOOD PRESSURE: 64 MMHG

## 2018-09-18 DIAGNOSIS — Z23 NEED FOR VACCINATION: ICD-10-CM

## 2018-09-18 DIAGNOSIS — Z11.8 SCREENING FOR CHLAMYDIAL DISEASE: ICD-10-CM

## 2018-09-18 DIAGNOSIS — Z12.4 SCREENING FOR CERVICAL CANCER: ICD-10-CM

## 2018-09-18 DIAGNOSIS — Z30.09 GENERAL COUNSELING AND ADVICE ON CONTRACEPTIVE MANAGEMENT: ICD-10-CM

## 2018-09-18 DIAGNOSIS — Z11.3 SCREEN FOR STD (SEXUALLY TRANSMITTED DISEASE): Primary | ICD-10-CM

## 2018-09-18 DIAGNOSIS — N89.8 ITCHING OF VAGINA: ICD-10-CM

## 2018-09-18 LAB
SPECIMEN SOURCE: NORMAL
WET PREP SPEC: NORMAL

## 2018-09-18 PROCEDURE — 86780 TREPONEMA PALLIDUM: CPT | Performed by: ADVANCED PRACTICE MIDWIFE

## 2018-09-18 PROCEDURE — G0145 SCR C/V CYTO,THINLAYER,RESCR: HCPCS | Performed by: ADVANCED PRACTICE MIDWIFE

## 2018-09-18 PROCEDURE — 87389 HIV-1 AG W/HIV-1&-2 AB AG IA: CPT | Performed by: ADVANCED PRACTICE MIDWIFE

## 2018-09-18 PROCEDURE — 36415 COLL VENOUS BLD VENIPUNCTURE: CPT | Performed by: ADVANCED PRACTICE MIDWIFE

## 2018-09-18 PROCEDURE — 87491 CHLMYD TRACH DNA AMP PROBE: CPT | Performed by: ADVANCED PRACTICE MIDWIFE

## 2018-09-18 PROCEDURE — 87591 N.GONORRHOEAE DNA AMP PROB: CPT | Performed by: ADVANCED PRACTICE MIDWIFE

## 2018-09-18 PROCEDURE — 90651 9VHPV VACCINE 2/3 DOSE IM: CPT | Performed by: ADVANCED PRACTICE MIDWIFE

## 2018-09-18 PROCEDURE — 87210 SMEAR WET MOUNT SALINE/INK: CPT | Performed by: ADVANCED PRACTICE MIDWIFE

## 2018-09-18 PROCEDURE — 99214 OFFICE O/P EST MOD 30 MIN: CPT | Mod: 25 | Performed by: ADVANCED PRACTICE MIDWIFE

## 2018-09-18 PROCEDURE — 86803 HEPATITIS C AB TEST: CPT | Performed by: ADVANCED PRACTICE MIDWIFE

## 2018-09-18 PROCEDURE — 90471 IMMUNIZATION ADMIN: CPT | Performed by: ADVANCED PRACTICE MIDWIFE

## 2018-09-18 NOTE — LETTER
September 25, 2018      Tiffany Munoz  [address] [place] [address]    Dear ,      I am happy to inform you that your recent cervical cancer screening test (PAP smear) was normal.      Preventative screenings such as this help to ensure your health for years to come. You should repeat a pap smear in 3 years, unless otherwise directed.      You will still need to return to the clinic every year for your annual exam and other preventive tests.     Please contact the clinic at [phone] if you have further questions.       Sincerely,      ROEL Rodas CNM/  Ivania Layton, RN, BSN  Pap Tracking

## 2018-09-18 NOTE — PROGRESS NOTES
"SUBJECTIVE:  Tiffany Munoz is a 23 year old female who presents for an STI screen due to potential exposure history.  She does not have a new partner.  She is currently using Nexplanon (placed in 2017) for birth control.    HPI: Tiffany states that she is in a monogamous relationship with her boyfriend of the past three years.  Recently, her boyfriend noticed \"spots on the shaft of his penis\" and was seen by his primary care physician yesterday. He was diagnosed with genital warts and was treated with cryotherapy yesterday.  \"He's waiting on his test results but I'm concerned that I might have them too; I want to be tested\".  Tiffany states that she and her boyfriend do not always use condoms while having sex. Tiffany states that her boyfriend told her that she was his first sexual partner; she's concerned that either she has the HPV virus and gave it to him, or that he's lying to her or possibly cheated on her.  She also complains of pain and spotting during intercourse recently. Tiffany states that her vagina \"has been itchy\" since she found out that her boyfriend has genital warts.    Tiffany denies any other vaginal symptoms and states that she has never noticed any lesions on her genital area. Her last pap smear was done on 12/9/15. She has never had the HPV vaccine.    Patient Active Problem List   Diagnosis     Malaise and fatigue     Contraception     Mild major depression (H)     Anxiety     Menorrhagia     Moderate episode of recurrent major depressive disorder (H) onset teens      Insomnia due to other mental disorder     Family history of malignant neoplasm of breast-pat & mat grdma      Breast pain-bilat-L>R since 15y/o      Large breasts     Past Medical History:   Diagnosis Date     Menorrhagia      Past Surgical History:   Procedure Laterality Date     ORTHOPEDIC SURGERY       Current Outpatient Prescriptions   Medication Sig Dispense Refill     etonogestrel (IMPLANON/NEXPLANON) 68 MG IMPL " "1 each (68 mg) by Subdermal route once for 1 dose 1 each 0     venlafaxine (EFFEXOR-ER) 75 MG TB24 24 hr tablet Take 1 tablet (75 mg) by mouth daily Two times a day (Patient not taking: Reported on 9/18/2018) 60 tablet 2     Allergies   Allergen Reactions     Dust Mites      Molds & Smuts      Other reaction(s): Unknown       Health maintenance updated:  no    ROS:  12 point review of systems negative other than symptoms noted below.  Genitourinary: Painful [place], Vaginal Discharge, Vaginal Dryness and Vaginal Itching    PHYSICAL EXAM:    /64  Pulse 64  Ht 5' 10\" (1.778 m)  Wt 164 lb (74.4 kg)  BMI 23.53 kg/m2    General appearance: healthy, alert, no distress and cooperative.  Pelvic Exam:  Vulva: No lesions upon close inspection, no adenopathy, BUS WNL  Perineum: No lesions  Vagina: Moist, pink, discharge normal, well rugated, no lesions  Cervix: smooth, pink, no visible lesions. Collected wet prep, GC/CT, pap smear  Bimanual exam:  Uterus smooth, mobile, nontender. Negative CMT. Adnexa without masses or tenderness.  Rectal exam: deferred; no lesions on anus    ASSESSMENT/PLAN    ICD-10-CM    1. Screen for STD (sexually transmitted disease) Z11.3 NEISSERIA GONORRHOEA PCR     HIV Antigen Antibody Combo     Treponema Abs w Reflex to RPR and Titer     Hepatitis C antibody   2. Screening for chlamydial disease Z11.8 CHLAMYDIA TRACHOMATIS PCR   3. Itching of vagina L29.8 Wet prep   4. Screening for cervical cancer Z12.4 Pap imaged thin layer screen only - recommended age 21 - 24 years   5. General counseling and advice on contraceptive management Z30.09    6. Need for vaccination Z23 HUMAN PAPILLOMA VIRUS (GARDASIL 9) VACCINE [36082]       Results for orders placed or performed in visit on 09/18/18   Wet prep   Result Value Ref Range    Specimen Description Vagina     Wet Prep No Trichomonas seen     Wet Prep No clue cells seen     Wet Prep No yeast seen           COUNSELING    Discussed that there is " no blood test for genital warts, and that her boyfriend was likely tested for other STIs. Full STI panel (with the exception of HSV, which Tiffany declines given she's never had lesions) completed today.     No genital warts seen upon inspection.  Discussed that, if Tiffany does notice any lesions, she should make an appointment in clinic and we can help her manage them.     Wet prep negative for vaginal infection or trichomonas. Recommended that Tiffany continue to monitor dysparunia and spotting with intercourse. She should make an appointment for further workup if labs WNL and symptoms persist.     Recommended HPV vaccine series for Tiffany to help protect her against high risk strains of HPV. First dose given today.    Pap smear collected today    Tiffany is due for an annual exam; Recommended that she make an appointment for an annual exam in two months, at the same time she is due for her second HPV dose.  May also consider rescreening for STIs given the nature of Tiffany's situation.    Condoms strongly recommended along with safe sex practices.    Return to clinic or call with questions or concerns    Ivania Camargo, RYLAND, APRN, CNM    30 minutes were spent face to face with the patient today discussing her history, diagnosis, and follow-up plan as noted above. Over 50% of the visit was spent in counseling and coordination of care.    Total Visit Time: 30 minutes.

## 2018-09-18 NOTE — PATIENT INSTRUCTIONS
Sexually Transmitted Infections (STIs)    Many STIs do not have any symptoms and can be transmitted through any type of sex, not just intercourse, but also anal, oral, and other types of sex play. Some STIs are transmitted by bacteria and others by viruses. It is important to keep yourself safe and infection free as many STIs have long term side effects.     Common STIs    Chlamydia  Gonorrhea   Genital Herpes  Genital warts/HPV (some strains of HPV cause cervical cancer)  Hepatitis A, B, & C  Syphilis   Trichomoniasis     Who should be screened?      Screening is available to anyone who wishes to be test, some tests are from a blood draw and some are a vaginal swab    Screening should be done even if people have no symptoms or feel fine    Women who have had unprotected sex with a new partner    Women who have had sex with more than one partner should be screened yearly for gonorrhea and chlamydia     The CDC also recommends women aged 25 and younger should be screened yearly for gonorrhea and chlamydia    Everyone should be screened at least once for HIV    Pregnant women are screened for STIs at their first OB visit    We can do all the screenings you need right here in clinic    If any screenings come back positive we will get you treated with the appropriate medications. Your partner (s) will also need to be screened and treated by their providers.    How to protect yourself      The most effective way to protect yourself from getting an STI is by using a condom every time that you have sex. (Remember male condoms made out of natural materials do not protect against STIs)    Ask us about vaccines, if you are under the age of 26 we can start a vaccine series for HPV prevention.    If you or your partner have herpes make sure to use a condom or abstain from sexual intercourse/genital contact when you have active lesions. Also avoid oral sex when you or your partner have cold sores    There is no surefire way to  prevent all STIs from being transmitted but proper screening and the methods above can help to reduce your risk!    Please ask your midwife at [facility]  if you have any questions

## 2018-09-18 NOTE — MR AVS SNAPSHOT
After Visit Summary   9/18/2018    Tiffany Munoz    MRN: [id number]           Patient Information     Date Of Birth          1995        Visit Information        Provider Department      9/18/2018 11:20 AM Ivania Camargo APRN CNM [facility]        Today's Diagnoses     Screen for STD (sexually transmitted disease)    -  1    Screening for chlamydial disease        Itching of vagina        Screening for cervical cancer        General counseling and advice on contraceptive management        Need for vaccination          Care Instructions    Sexually Transmitted Infections (STIs)    Many STIs do not have any symptoms and can be transmitted through any type of sex, not just intercourse, but also anal, oral, and other types of sex play. Some STIs are transmitted by bacteria and others by viruses. It is important to keep yourself safe and infection free as many STIs have long term side effects.     Common STIs    Chlamydia  Gonorrhea   Genital Herpes  Genital warts/HPV (some strains of HPV cause cervical cancer)  Hepatitis A, B, & C  Syphilis   Trichomoniasis     Who should be screened?      Screening is available to anyone who wishes to be test, some tests are from a blood draw and some are a vaginal swab    Screening should be done even if people have no symptoms or feel fine    Women who have had unprotected sex with a new partner    Women who have had sex with more than one partner should be screened yearly for gonorrhea and chlamydia     The CDC also recommends women aged 25 and younger should be screened yearly for gonorrhea and chlamydia    Everyone should be screened at least once for HIV    Pregnant women are screened for STIs at their first OB visit    We can do all the screenings you need right here in clinic    If any screenings come back positive we will get you treated with the appropriate medications. Your partner (s) will also need to be screened and  treated by their providers.    How to protect yourself      The most effective way to protect yourself from getting an STI is by using a condom every time that you have sex. (Remember male condoms made out of natural materials do not protect against STIs)    Ask us about vaccines, if you are under the age of 26 we can start a vaccine series for HPV prevention.    If you or your partner have herpes make sure to use a condom or abstain from sexual intercourse/genital contact when you have active lesions. Also avoid oral sex when you or your partner have cold sores    There is no surefire way to prevent all STIs from being transmitted but proper screening and the methods above can help to reduce your risk!    Please ask your midwife at [facility]  if you have any questions             Follow-ups after your visit        Follow-up notes from your care team     Return in about 2 months (around 11/18/2018).      Who to contact     If you have questions or need follow up information about today's clinic visit or your schedule please contact [facility] directly at [phone].  Normal or non-critical lab and imaging results will be communicated to you by MyChart, letter or phone within 4 business days after the clinic has received the results. If you do not hear from us within 7 days, please contact the clinic through MyChart or phone. If you have a critical or abnormal lab result, we will notify you by phone as soon as possible.  Submit refill requests through [employer] or call your pharmacy and they will forward the refill request to us. Please allow 3 business days for your refill to be completed.          Additional Information About Your Visit        Missingameshart Information     [employer] gives you secure access to your electronic health record. If you see a primary care provider, you can also send messages to your care team and make appointments. If you have questions, please call your  "primary care clinic.  If you do not have a primary care provider, please call [phone] and they will assist you.        Care EveryWhere ID     This is your Care EveryWhere ID. This could be used by other organizations to access your [facility] medical records  [id number]        Your Vitals Were     Pulse Height BMI (Body Mass Index)             64 5' 10\" (1.778 m) 23.53 kg/m2          Blood Pressure from Last 3 Encounters:   09/18/18 112/64   05/04/18 108/62   03/16/18 110/80    Weight from Last 3 Encounters:   09/18/18 164 lb (74.4 kg)   05/04/18 159 lb 8 oz (72.3 kg)   03/16/18 160 lb (72.6 kg)              We Performed the Following     CHLAMYDIA TRACHOMATIS PCR     Hepatitis C antibody     HIV Antigen Antibody Combo     HUMAN PAPILLOMA VIRUS (GARDASIL 9) VACCINE [[id number]]     NEISSERIA GONORRHOEA PCR     Pap imaged thin layer screen only - recommended age 21 - 24 years     Treponema Abs w Reflex to RPR and Titer     Wet prep        Primary Care Provider Office Phone # Fax #    Codie Diana De La Vega [phone] [address] [facility] [address]        Equal Access to Services     SETH BETTS AH: Hadii aad ku hadasho Soomaali, waaxda luqadaha, qaybta kaalmada adeegyada, clemente thompson. So [facility] [phone].    ATENCIÓN: Si habla español, tiene a vizcarra disposición servicios gratuitos de asistencia lingüística. Llame al [phone].    We comply with applicable federal civil rights laws and [place] laws. We do not discriminate on the basis of race, color, national origin, age, disability, sex, sexual orientation, or gender identity.            Thank you!     Thank you for choosing [facility]  for your care. Our goal is always to provide you with excellent care. Hearing back from our patients is one way we can continue to improve our services. Please take a few minutes to complete the written survey that you may receive in the mail " after your visit with us. Thank you!             Your Updated Medication List - Protect others around you: Learn how to safely use, store and throw away your medicines at www.disposemymeds.org.          This list is accurate as of 9/18/18  5:29 PM.  Always use your most recent med list.                   Brand Name Dispense Instructions for use Diagnosis    etonogestrel 68 MG Impl    IMPLANON/NEXPLANON    1 each    1 each (68 mg) by Subdermal route once for 1 dose    Nexplanon insertion       venlafaxine 75 MG Tb24 24 hr tablet    EFFEXOR-ER    60 tablet    Take 1 tablet (75 mg) by mouth daily Two times a day    Anxiety, Moderate episode of recurrent major depressive disorder (H)

## 2018-09-19 LAB
C TRACH DNA SPEC QL NAA+PROBE: NEGATIVE
HCV AB SERPL QL IA: NONREACTIVE
HIV 1+2 AB+HIV1 P24 AG SERPL QL IA: NONREACTIVE
N GONORRHOEA DNA SPEC QL NAA+PROBE: NEGATIVE
SPECIMEN SOURCE: NORMAL
SPECIMEN SOURCE: NORMAL
T PALLIDUM AB SER QL: NONREACTIVE

## 2018-09-21 LAB
COPATH REPORT: NORMAL
PAP: NORMAL

## 2018-11-16 DIAGNOSIS — Z23 NEED FOR HPV VACCINE: Primary | ICD-10-CM

## 2018-11-16 PROCEDURE — 90471 IMMUNIZATION ADMIN: CPT

## 2018-11-16 PROCEDURE — 90651 9VHPV VACCINE 2/3 DOSE IM: CPT

## 2018-11-16 NOTE — PROGRESS NOTES
Screening Questionnaire for Adult Immunization    Are you sick today?   No   Do you have allergies to medications, food, a vaccine component or latex?   No   Have you ever had a serious reaction after receiving a vaccination?   No   Do you have a long-term health problem with heart disease, lung disease, asthma, kidney disease, metabolic disease (e.g. diabetes), anemia, or other blood disorder?   No   Do you have cancer, leukemia, HIV/AIDS, or any other immune system problem?   No   In the past 3 months, have you taken medications that affect  your immune system, such as prednisone, other steroids, or anticancer drugs; drugs for the treatment of rheumatoid arthritis, Crohn s disease, or psoriasis; or have you had radiation treatments?   No   Have you had a seizure, or a brain or other nervous system problem?   No   During the past year, have you received a transfusion of blood or blood     products, or been given immune (gamma) globulin or antiviral drug?   No   For women: Are you pregnant or is there a chance you could become        pregnant during the next month?   No   Have you received any vaccinations in the past 4 weeks?   No     Immunization questionnaire answers were all negative.        Per orders of Ivania Camargo, injection of Gardasil given by Kirsten Patton. Patient instructed to remain in clinic for 15 minutes afterwards, and to report any adverse reaction to me immediately.       Screening performed by Kirsten Patton on 11/16/2018 at 11:29 AM.

## 2019-03-14 ENCOUNTER — OFFICE VISIT (OUTPATIENT)
Dept: FAMILY MEDICINE | Facility: CLINIC | Age: 24
End: 2019-03-14
Payer: COMMERCIAL

## 2019-03-14 VITALS
RESPIRATION RATE: 16 BRPM | HEIGHT: 70 IN | HEART RATE: 100 BPM | BODY MASS INDEX: 23.19 KG/M2 | SYSTOLIC BLOOD PRESSURE: 100 MMHG | DIASTOLIC BLOOD PRESSURE: 70 MMHG | OXYGEN SATURATION: 98 % | WEIGHT: 162 LBS | TEMPERATURE: 98.5 F

## 2019-03-14 DIAGNOSIS — J02.9 SORE THROAT: Primary | ICD-10-CM

## 2019-03-14 LAB
DEPRECATED S PYO AG THROAT QL EIA: NORMAL
SPECIMEN SOURCE: NORMAL

## 2019-03-14 PROCEDURE — 87081 CULTURE SCREEN ONLY: CPT | Performed by: FAMILY MEDICINE

## 2019-03-14 PROCEDURE — 99213 OFFICE O/P EST LOW 20 MIN: CPT | Performed by: FAMILY MEDICINE

## 2019-03-14 PROCEDURE — 87880 STREP A ASSAY W/OPTIC: CPT | Performed by: FAMILY MEDICINE

## 2019-03-14 ASSESSMENT — PATIENT HEALTH QUESTIONNAIRE - PHQ9
10. IF YOU CHECKED OFF ANY PROBLEMS, HOW DIFFICULT HAVE THESE PROBLEMS MADE IT FOR YOU TO DO YOUR WORK, TAKE CARE OF THINGS AT HOME, OR GET ALONG WITH OTHER PEOPLE: NOT DIFFICULT AT ALL
SUM OF ALL RESPONSES TO PHQ QUESTIONS 1-9: 2
SUM OF ALL RESPONSES TO PHQ QUESTIONS 1-9: 2

## 2019-03-14 ASSESSMENT — MIFFLIN-ST. JEOR: SCORE: 1565.08

## 2019-03-14 NOTE — LETTER
March 14, 2019      Tiffany Munoz  [address] [place] [address]        To Whom It May Concern:    Tiffany Munoz was seen in our clinic today. She may return to work tomorrow if she is feeling better.       Sincerely,        Kaylee Rasheed, DO

## 2019-03-14 NOTE — PROGRESS NOTES
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  Sister has PNA and currently on antibiotics.     Problem list and histories reviewed & adjusted, as indicated.  Additional history: as documented    Labs reviewed in EPIC    Reviewed and updated as needed this visit by clinical staff  Tobacco  Allergies  Meds  Problems  Med Hx  Surg Hx  Fam Hx  Soc Hx        Reviewed and updated as needed this visit by Provider  Tobacco  Allergies  Meds  Problems  Med Hx  Surg Hx  Fam Hx         ROS:  C: NEGATIVE for fever, chills, change in weight  I: NEGATIVE for worrisome rashes, moles or lesions  E: NEGATIVE for vision changes or irritation  CV: NEGATIVE for chest pain, palpitations or peripheral edema  GI: NEGATIVE for nausea, abdominal pain, heartburn, or change in bowel habits  M: NEGATIVE for significant arthralgias or myalgia  H: NEGATIVE for bleeding problems    OBJECTIVE:     /70   Pulse 100   Temp 98.5  F (36.9  C) (Tympanic)   Resp 16   Ht 1.778 m (5' 10\")   Wt 73.5 kg (162 lb)   LMP  (LMP Unknown)   SpO2 98%   Breastfeeding? No   BMI 23.24 kg/m    Body mass index is 23.24 kg/m .   GENERAL: alert and no acute distress  EYES: Eyes grossly normal to inspection, PERRL and conjunctivae and sclerae normal  HENT: ear canals and TM's normal, mouth without ulcers or lesions.  +b/l boggy turbinates, no active nasal drainage.  NECK: no adenopathy, no " asymmetry, masses, or scars and thyroid normal to palpation  RESP: lungs clear to auscultation - no rales, rhonchi or wheezes  CV: regular rate and rhythm, normal S1 S2, no S3 or S4, no murmur, click or rub, no peripheral edema and peripheral pulses strong  SKIN: no suspicious lesions or rashes        Diagnostic Test Results:  Rapid Strep, Strep culture    ASSESSMENT/PLAN:     Problem List Items Addressed This Visit     None      Visit Diagnoses     Sore throat    -  Primary    Relevant Medications    lidocaine VISCOUS (XYLOCAINE) 2 % solution    Other Relevant Orders    Strep, Rapid Screen (Completed)    Beta strep group A culture (Completed)         Rapid Strep negative.  Strep culture pending.  --push fluids, rest, and symptomatic treatment as needed:  Increase humidity to 30-40% in bedroom at night - vaporizer  Saline nasal spray as needed  Benadryl 25mg 1/2 - 1 hour before bed time  Maintain 8 hr minimum of sleep at night  Robitussin for cough  Rx Lidocaine for sore throat  --Will return to clinic as needed.  See Patient Instructions for details and follow-up instructions      Kaylee Rasheed, DO  [facility]    Answers for HPI/ROS submitted by the patient on 3/14/2019   If you checked off any problems, how difficult have these problems made it for you to do your work, take care of things at home, or get along with other people?: Not difficult at all  PHQ9 TOTAL SCORE: 2

## 2019-03-15 LAB
BACTERIA SPEC CULT: NORMAL
SPECIMEN SOURCE: NORMAL

## 2019-11-18 ENCOUNTER — NURSE TRIAGE (OUTPATIENT)
Dept: NURSING | Facility: CLINIC | Age: 24
End: 2019-11-18

## 2019-11-18 ENCOUNTER — OFFICE VISIT (OUTPATIENT)
Dept: FAMILY MEDICINE | Facility: CLINIC | Age: 24
End: 2019-11-18
Payer: COMMERCIAL

## 2019-11-18 VITALS
DIASTOLIC BLOOD PRESSURE: 70 MMHG | RESPIRATION RATE: 14 BRPM | OXYGEN SATURATION: 97 % | SYSTOLIC BLOOD PRESSURE: 108 MMHG | BODY MASS INDEX: 21.95 KG/M2 | HEIGHT: 70 IN | WEIGHT: 153.3 LBS | HEART RATE: 70 BPM | TEMPERATURE: 99.3 F

## 2019-11-18 DIAGNOSIS — F41.9 ANXIETY: ICD-10-CM

## 2019-11-18 DIAGNOSIS — M54.50 ACUTE BILATERAL LOW BACK PAIN WITHOUT SCIATICA: Primary | ICD-10-CM

## 2019-11-18 DIAGNOSIS — F32.0 MILD MAJOR DEPRESSION (H): ICD-10-CM

## 2019-11-18 PROCEDURE — 99214 OFFICE O/P EST MOD 30 MIN: CPT | Performed by: FAMILY MEDICINE

## 2019-11-18 RX ORDER — CYCLOBENZAPRINE HCL 5 MG
5 TABLET ORAL 3 TIMES DAILY PRN
Qty: 30 TABLET | Refills: 0 | Status: SHIPPED | OUTPATIENT
Start: 2019-11-18 | End: 2020-07-02

## 2019-11-18 ASSESSMENT — MIFFLIN-ST. JEOR: SCORE: 1520.61

## 2019-11-18 ASSESSMENT — ANXIETY QUESTIONNAIRES
1. FEELING NERVOUS, ANXIOUS, OR ON EDGE: MORE THAN HALF THE DAYS
6. BECOMING EASILY ANNOYED OR IRRITABLE: NEARLY EVERY DAY
7. FEELING AFRAID AS IF SOMETHING AWFUL MIGHT HAPPEN: NOT AT ALL
2. NOT BEING ABLE TO STOP OR CONTROL WORRYING: SEVERAL DAYS
3. WORRYING TOO MUCH ABOUT DIFFERENT THINGS: MORE THAN HALF THE DAYS
GAD7 TOTAL SCORE: 11
IF YOU CHECKED OFF ANY PROBLEMS ON THIS QUESTIONNAIRE, HOW DIFFICULT HAVE THESE PROBLEMS MADE IT FOR YOU TO DO YOUR WORK, TAKE CARE OF THINGS AT HOME, OR GET ALONG WITH OTHER PEOPLE: VERY DIFFICULT
5. BEING SO RESTLESS THAT IT IS HARD TO SIT STILL: SEVERAL DAYS

## 2019-11-18 ASSESSMENT — PATIENT HEALTH QUESTIONNAIRE - PHQ9
5. POOR APPETITE OR OVEREATING: MORE THAN HALF THE DAYS
SUM OF ALL RESPONSES TO PHQ QUESTIONS 1-9: 12

## 2019-11-18 NOTE — TELEPHONE ENCOUNTER
"Tiffany is having back pain and is struggling to walk.  Pain started Saturday November 9th am.  Pain is currently \"7 or 8\".  Tiffany has tried Icing and ibuprofen and is having no relief.  Today Tiffany is asking about a chiropractor.      Reason for Disposition    [1] SEVERE back pain (e.g., excruciating, unable to do any normal activities) AND [2] not improved 2 hours after pain medicine    Additional Information    Negative: Passed out (i.e., lost consciousness, collapsed and was not responding)    Negative: Shock suspected (e.g., cold/pale/clammy skin, too weak to stand, low BP, rapid pulse)    Negative: Sounds like a life-threatening emergency to the triager    Negative: [1] SEVERE back pain (e.g., excruciating) AND [2] sudden onset AND [3] age > 60    Negative: [1] Unable to urinate (or only a few drops) > 4 hours AND     [2] bladder feels very full (e.g., palpable bladder or strong urge to urinate)    Negative: [1] Urinary or bowel incontinence (i.e., loss of bladder or bowel control) AND [2] new onset    Negative: Numbness in groin or rectal area (i.e., loss of sensation)    Negative: [1] SEVERE abdominal pain AND [2] present > 1 hour    Negative: [1] Abdominal pain AND [2] age > 60    Negative: Weakness of a leg or foot (e.g., unable to bear weight, dragging foot)    Negative: Unable to walk    Negative: Patient sounds very sick or weak to the triager    Protocols used: BACK PAIN-A-AH      "

## 2019-11-18 NOTE — PROGRESS NOTES
"Subjective     Tiffany Munoz is a 24 year old female who presents to clinic today for the following health issues:    HPI   Back Pain       Duration: Sudden onset on Saturday        Specific cause: none    Description:   Location of pain: low back middle  Character of pain: sharp and dull ache  Pain radiation:none  New numbness or weakness in legs, not attributed to pain:  no     Intensity: Currently 7/10    History:   Pain interferes with job: YES, No  History of back problems: Limited episodes of back pain  Any previous MRI or X-rays: None  Sees a specialist for back pain:  No  Therapies tried without relief: cold, heat, NSAIDs and stretch    Alleviating factors:   Improved by: stretch      Precipitating factors:  Worsened by: Lifting, Bending, Standing, Sitting and Lying Flat      Accompanying Signs & Symptoms:  Risk of Fracture:  None  Risk of Cauda Equina:  None  Risk of Infection:  None  Risk of Cancer:  None  Risk of Ankylosing Spondylitis:  Onset at age <35, male, AND morning back stiffness. no       Reviewed and updated as needed this visit by Provider  Tobacco  Allergies  Meds  Problems  Med Hx  Surg Hx  Fam Hx         Review of Systems   ROS COMP: CONSTITUTIONAL: NEGATIVE for fever, chills, change in weight  INTEGUMENTARY/SKIN: NEGATIVE for worrisome rashes, moles or lesions  EYES: NEGATIVE for vision changes or irritation  ENT/MOUTH: NEGATIVE for ear, mouth and throat problems  RESP: NEGATIVE for significant cough or SOB  CV: NEGATIVE for chest pain, palpitations or peripheral edema  GI: NEGATIVE for nausea, abdominal pain, heartburn, or change in bowel habits  : NEGATIVE for frequency, dysuria, or hematuria  HEME: NEGATIVE for bleeding problems        Objective    /70 (Patient Position: Sitting, Cuff Size: Adult Regular)   Pulse 70   Temp 99.3  F (37.4  C) (Tympanic)   Resp 14   Ht 1.778 m (5' 10\")   Wt 69.5 kg (153 lb 4.8 oz)   LMP 11/06/2019 (Approximate)   SpO2 97%   " Breastfeeding No   BMI 22.00 kg/m    Body mass index is 22 kg/m .  Physical Exam   GENERAL: alert and oriented, in no acute distress  EYES: grossly normal, no injection  HEENT: atraumatic, normocephalic. Oral mucosa moist and hydrated.  NECK: normal ROM, supple, symmetric  HEART: Normal sinus rhythm, no murmurs rubs or gallops  LUNGS: clear to auscultation bilat, no wheeze, rhonchi, or rales  ABDOMEN: Soft, NTND,  with normoactive bowel sounds.  EXTREMITIES: Warm, well perfused. No gross deformities. No peripheral edema  BACK: symmetric, no curvature. No spinal or CVA tenderness. TTP over lumbar paraspinal muscles with moderate hypertonicity noted. Decreased pelvic ROM secondary to pain especially with extension.  Neuro: Gait normal without a limp.  Reflexes normal and symmetric. Sensation grossly normal.  SKIN: Warm and dry without lesions      Diagnostic Test Results:  Labs reviewed in Epic        Assessment & Plan   Problem List Items Addressed This Visit     Mild major depression (H)    Anxiety      Other Visit Diagnoses     Acute bilateral low back pain without sciatica    -  Primary    Relevant Medications    cyclobenzaprine (FLEXERIL) 5 MG tablet    Other Relevant Orders    [facility]FERR[facility]         Refer to [facility] for lumbar spine pain.    Rx Flexeril.  Continue Tylenol/NSAIDs prn  Alternate with Cold and Warm compresses    Mood--worsening, see PHQ-9 and NGA-7 scores.  Wondering if she should re-start Effexor but pt chose to defer for now.  Will RTC to get Effexor and mood re-checked at a later time.        See Patient Instructions  Return in about 4 weeks (around 12/16/2019) for re-check / follow-up.    Kaylee Rasheed, DO  [facility]

## 2019-11-18 NOTE — LETTER
November 18, 2019      Tiffany Munoz  [address] [place] [address]        To Whom It May Concern:    Tiffany Munoz was seen in our clinic today.   Please excuse her absence from work today (11/18/19) and tomorrow (11/19/19) due to an illness.     She may return to work on Wednesday (11/20/19) if feeling better.         Sincerely,        Kaylee Rasheed, DO

## 2019-11-19 ASSESSMENT — ANXIETY QUESTIONNAIRES: GAD7 TOTAL SCORE: 11

## 2019-11-26 ENCOUNTER — OFFICE VISIT (OUTPATIENT)
Dept: FAMILY MEDICINE | Facility: CLINIC | Age: 24
End: 2019-11-26
Payer: COMMERCIAL

## 2019-11-26 VITALS
OXYGEN SATURATION: 98 % | RESPIRATION RATE: 14 BRPM | SYSTOLIC BLOOD PRESSURE: 124 MMHG | BODY MASS INDEX: 22.67 KG/M2 | HEART RATE: 87 BPM | WEIGHT: 158 LBS | TEMPERATURE: 97.6 F | DIASTOLIC BLOOD PRESSURE: 70 MMHG

## 2019-11-26 DIAGNOSIS — J06.9 VIRAL URI WITH COUGH: ICD-10-CM

## 2019-11-26 DIAGNOSIS — J02.9 ACUTE PHARYNGITIS, UNSPECIFIED ETIOLOGY: Primary | ICD-10-CM

## 2019-11-26 LAB
DEPRECATED S PYO AG THROAT QL EIA: NORMAL
SPECIMEN SOURCE: NORMAL

## 2019-11-26 PROCEDURE — 87081 CULTURE SCREEN ONLY: CPT | Performed by: PHYSICIAN ASSISTANT

## 2019-11-26 PROCEDURE — 99213 OFFICE O/P EST LOW 20 MIN: CPT | Performed by: PHYSICIAN ASSISTANT

## 2019-11-26 PROCEDURE — 87880 STREP A ASSAY W/OPTIC: CPT | Performed by: PHYSICIAN ASSISTANT

## 2019-11-26 RX ORDER — BENZONATATE 200 MG/1
200 CAPSULE ORAL 3 TIMES DAILY PRN
Qty: 30 CAPSULE | Refills: 1 | Status: CANCELLED | OUTPATIENT
Start: 2019-11-26

## 2019-11-26 RX ORDER — LIDOCAINE HYDROCHLORIDE 20 MG/ML
15 SOLUTION OROPHARYNGEAL EVERY 4 HOURS PRN
Qty: 100 ML | Refills: 0 | Status: CANCELLED | OUTPATIENT
Start: 2019-11-26

## 2019-11-26 NOTE — PROGRESS NOTES
Subjective     Tiffany Munoz is a 24 year old female who presents to clinic today for the following health issues:    HPI   ENT Symptoms             Symptoms: cc Present Absent Comment   Fever/Chills  x     Fatigue  x     Muscle Aches  x     Eye Irritation   x    Sneezing   x    Nasal Rogelio/Drg  x     Sinus Pressure/Pain   x    Loss of smell   x    Dental pain       Sore Throat  x  pain with swallowing   Swollen Glands   x    Ear Pain/Fullness  x  both   Cough  x     Wheeze   x    Chest Pain   x    Shortness of breath   x    Rash   x    Other   x      Symptom duration:  1 week, and sxs just became worse yesterday   Symptom severity:  moderate   Treatments tried:  ibuprofen to lower fever, used this morning.   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  Fever of 100.3 two days ago.       Review of Systems   C: POSITIVE for fever and chills.  Skin: Negative for worrisome rashes or lesions  ENT/MOUTH:POSITIVE for congestion, runny nose, ear pain, sore throat, pain with swallowing and tinnitus.  Negative for vertigo.  Resp: POSITIVE for cough occasionally productive without  SOB and wheezing  MS: POSITIVE for generalized fatigue and malaise.  NEURO: Negative  for headaches or dizziness.  P: Negative for changes in mood or affect  ROS otherwise negative.    Chart Review:  History   Smoking Status     Never Smoker   Smokeless Tobacco     Never Used     History of asthma as a child.      Patient Active Problem List   Diagnosis     Malaise and fatigue     Contraception     Anxiety     Menorrhagia     Moderate episode of recurrent major depressive disorder (H) onset teens      Insomnia due to other mental disorder     Family  history of malignant neoplasm of breast-pat & mat grdma      Breast pain-bilat-L>R since 15y/o      Large breasts     Past Surgical History:   Procedure Laterality Date     ORTHOPEDIC SURGERY       Problem list, Medication list, Allergies, Medical/Social/Surg hx reviewed in [facility], updated as appropriate.           Objective    /70   Pulse 87   Temp 97.6  F (36.4  C) (Tympanic)   Resp 14   Wt 71.7 kg (158 lb)   LMP 11/06/2019 (Approximate)   SpO2 98%   BMI 22.67 kg/m    Body mass index is 22.67 kg/m .  Physical Exam   GENERAL: healthy, alert, in no acute distress  EYES: Grossly normal to inspection, EOMI, PERRL  HENT: Ear canals normal bilaterally. TM dull gray  bulging with serous effusion bilaterally.  Nasal mucosa mildly edematous with clear rhinorrhea.  No septal deviation.  Mouth- mucous membranes moist, no lesions or ulcerations. Pharynx erythematous without petechia. and No tonsillary hypertrophy. Uvula midline, no  post-nasal drainage.  Maxillary and frontal sinuses nontender to palpation bilaterally  NECK:1+ posterior cervical LAD bilaterally  RESP: lungs clear to auscultation - no rales, no rhonchi, no wheezes  CV: regular rate and rhythm, normal S1 S2.  No peripheral edema.  SKIN: no suspicious lesions, no rashes  PSYCH: Alert and oriented times 3;  Able to articulate logical thoughts. Affect is normal.    Diagnostic test results: Negative strep        Assessment & Plan       ICD-10-CM    1. Acute pharyngitis, unspecified etiology J02.9 Strep, Rapid Screen     Discussed negative rapid.  Will call if culture returns positive.  Continue OTC treatments.  We can send in prescription for viscous lidocaine if pt changes her mind.  I worsening symptoms in a week, will send in antibiotic for sinusitis.  Can send MyC message to request this.    Please see patient instructions for treatment details.    Return in about 2 weeks (around 12/10/2019) for Recheck if not improving.    Niyah Page  MARKY Boateng  [facility]

## 2019-11-26 NOTE — PATIENT INSTRUCTIONS
90% of sore throats are caused by a virus (usually a cold virus).  Symptoms usually last 7-10 days and the throat is usually the most painful at day 3 or 4.  Gargle with warm salt water (1/4 teaspoon of salt per glass).  Take 600 mg ibuprofen (Advil/Motrin) or 650 mg acetaminophen (Tylenol) every 6 hours to help with pain.  Drink warm fluids (tea with honey) and soft foods (chicken noodle soup) to help soothe the throat.  If pain has not improved after 14 days of symptoms, this may suggestion a transition from viral infection to a bacterial infection.  Follow up for a reevaluation.  Do not use leftover antibiotics from siblings or friends. Leftover antibiotics should be thrown out because they deteriorate faster than other drugs. Also, antibiotics help only strep throats. They have no effect on viruses, and they can cause harm. They also make it difficult to find out what if symptoms worsen.    Sore Throat  A sore throat can be caused by many things, from viral infections (most often, the common cold or flu) and bacterial infections (strep throat and some cases of tonsillitis) to seasonal allergies and gastroesophageal reflux disease (GERD).  Causes  Many sore throats are due to:  Strep throat, which is a contagious bacterial infection of the throat and tonsils (the fleshy clusters of tissue on both sides of the back of the throat). It also can cause headache and fever.   Tonsillitis, which is usually not serious but can lead to complications, like breathing or swallowing trouble. Most cases are caused by either a virus (such as a common cold virus or Angela Barr virus, the virus that causes mono) or strep bacteria.   Keep in Mind  To help prevent the spread of infection to others:  Wash hands well and often.   Keep eating utensils separate and wash them in hot, soapy water or a  after each use.   Don't share food, drinks, napkins, or towels.   Sneeze or cough into a shirtsleeve, not your hands.

## 2019-11-27 LAB
BACTERIA SPEC CULT: NORMAL
SPECIMEN SOURCE: NORMAL

## 2019-11-27 NOTE — RESULT ENCOUNTER NOTE
Zhen Allen,    I just wanted to let you know that your lab results have been reviewed and are attached.    - Your throat culture was negative for strep.    Please let me know if you have any questions and have a great week!    Sincerely,    Nereyda Boateng PA-C    [facility]  [address]  [place] [address]  [phone] (p)

## 2019-12-16 ENCOUNTER — HEALTH MAINTENANCE LETTER (OUTPATIENT)
Age: 24
End: 2019-12-16

## 2020-06-29 NOTE — PROGRESS NOTES
"NEXPLANON REMOVAL/REINSERTION:    Subjective: Tiffany Munoz is a 24 year old  presents for Nexplanon removal and replacment.    Patient has been given the opportunity to ask questions about all forms of birth control, including all options appropriate for Tiffany Munoz. Discussed that no method of birth control, except abstinence is 100% effective against pregnancy or sexually transmitted infection.     The entire removal and insertion procedure was reviewed with the patient, including care after placement.      There were no vitals taken for this visit.    PROCEDURE NOTE: -- Nexplanon Removal/Insertion    Technique: On the {left/right:983259} arm  Skin prep {BETADINE/TECHNICARE:900700901}  Anesthesia {LIDOCAINE:439698529}  Procedure: Patient was placed supine with {left/right:078089} arm exposed.  Implant located by palpitation. Paulo was made 8-10 cm above medial epicondyle and a guiding paulo 4 cm above the first.  Arm was prepped with {cleansing agents:748653::\"Betadine\"}. Incision point was anesthetized with *** mL 1% lidocaine.     Small incision (<5mm) was made at distal end of palpable implant, curved hemostat or mosquito forceps was used to isolate the implant and bring it to the incision, the fibrous capsule containing the implant  was incised and the implant was removed intact.    After stretching the skin with thumb and index finger around the insertion site, skin punctured with the tip of the needle inserted at 30 degrees and then lowered to horizontal position. While lifting the skin with the tip of the needle, inserted the needle to its full length. Applicator was then stabilized and the slider was unlocked by pushing it slightly down. Slider moved back completely until it stopped. Applicator was then removed.    Correct placement of the implant was confirmed by palpation in the patient's arm and visualizing the purple top of the obturator.   Bandage and pressure dressing applied to " insertion site.    Lot # [id number]  NDC: 8457-6118-04  Exp: 09/27/2022    EBL: minimal    Complications: none    ASSESSMENT: No diagnosis found.     PLAN:    Given 's handouts, including when to have Nexplanon removed, list of danger s/sx, side effects and follow up recommended. Encouraged condom use for prevention of STD. Back up contraception advised for 7 days. Advised to call for any fever, for prolonged or severe pain or bleeding, abnormal vaginal dischage. She was advised to use pain medications (ibuprofen) as needed for mild to moderate pain.     Idalia Houston MD

## 2020-07-02 ENCOUNTER — OFFICE VISIT (OUTPATIENT)
Dept: OBGYN | Facility: CLINIC | Age: 25
End: 2020-07-02
Payer: COMMERCIAL

## 2020-07-02 VITALS
HEIGHT: 70 IN | DIASTOLIC BLOOD PRESSURE: 78 MMHG | BODY MASS INDEX: 20.9 KG/M2 | SYSTOLIC BLOOD PRESSURE: 104 MMHG | WEIGHT: 146 LBS

## 2020-07-02 DIAGNOSIS — Z30.432 ENCOUNTER FOR REMOVAL OF INTRAUTERINE CONTRACEPTIVE DEVICE: ICD-10-CM

## 2020-07-02 DIAGNOSIS — Z01.812 PRE-PROCEDURE LAB EXAM: Primary | ICD-10-CM

## 2020-07-02 LAB — HCG UR QL: NORMAL

## 2020-07-02 PROCEDURE — 11982 REMOVE DRUG IMPLANT DEVICE: CPT | Performed by: OBSTETRICS & GYNECOLOGY

## 2020-07-02 RX ORDER — NORGESTIMATE AND ETHINYL ESTRADIOL 0.25-0.035
1 KIT ORAL DAILY
Qty: 90 TABLET | Refills: 3 | Status: SHIPPED | OUTPATIENT
Start: 2020-07-02 | End: 2021-05-14

## 2020-07-02 ASSESSMENT — MIFFLIN-ST. JEOR: SCORE: 1487.5

## 2020-07-02 NOTE — NURSING NOTE
Clinic Administered Medication Documentation      Intrauterine/Implant Insertion Documentation    Device was placed by provider (please see MAR for given by information). Please see MAR and medication order for additional information.     Type: Nexplanon  Remove/Replace by: 7/2/2023    Lot # [id number]  NDC: 1899-6752-82  Exp: 09/27/2022

## 2020-07-02 NOTE — PROGRESS NOTES
Nexplanon Removal:     Tiffany Munoz is here for removal of etonogestrel implant Nexplanon/Implanon    Indication:       Preoperative Diagnosis: etonogestrel implant  Postoperative Diagnosis: etonogestrel implant removed    Technique: On the left arm  Skin prep Betadine  Anesthesia 1% lidocaine  Procedure: Small incision (<5mm) was made at distal end of palpable implant, curved hemostat or mosquito forceps was used to isolate the implant and bring it to the incision, the fibrous capsule containing the implant  was incised and the Implant was removed intact.    EBL: minimal  Complications:  No  Tolerance:  Pt tolerated procedure well and was in stable condition.   Dressing:    A pressure bandage was placed for the next 12-24 hours.    Contraception was discussed and patient chose the following method oral contraceptives      Follow up: Pt was instructed to call if bleeding, severe pain or foul smell.     Idalia Houston MD

## 2020-07-13 ENCOUNTER — VIRTUAL VISIT (OUTPATIENT)
Dept: FAMILY MEDICINE | Facility: OTHER | Age: 25
End: 2020-07-13
Payer: COMMERCIAL

## 2020-07-13 PROCEDURE — 99421 OL DIG E/M SVC 5-10 MIN: CPT | Performed by: PHYSICIAN ASSISTANT

## 2020-07-13 NOTE — PROGRESS NOTES
"Date: 2020 16:26:29  Clinician: Wallace Haque  Clinician NPI: [id number]  Patient: Tiffany Munoz  Patient : 1995  Patient Address: [address] [place] [address]  Patient Phone: [phone]  Visit Protocol: URI  Patient Summary:  Tiffany is a 24 year old ( : 1995 ) female who initiated a Visit for COVID-19 (Coronavirus) evaluation and screening. When asked the question \"Please sign me up to receive news, health information and promotions from [employer].\", Tiffany responded \"No\".    Tiffany states her symptoms started 1-2 days ago.   Her symptoms consist of a headache, myalgia, and facial pain or pressure.   Symptom details     Facial pain or pressure: The facial pain or pressure feels worse when bending over or leaning forward.     Headache: She states the headache is moderate (4-6 on a 10 point pain scale).      Tiffany denies having wheezing, nausea, teeth pain, ageusia, diarrhea, vomiting, rhinitis, malaise, ear pain, chills, sore throat, enlarged lymph nodes, anosmia, fever, cough, and nasal congestion. She also denies having recent facial or sinus surgery in the past 60 days and taking antibiotic medication in the past month. She is not experiencing dyspnea.   Precipitating events  She has not recently been exposed to someone with influenza. Tiffany has been in close contact with the following high risk individuals: pregnant women, children under the age of 5, and adults 65 or older.   Pertinent COVID-19 (Coronavirus) information  In the past 14 days, Tiffany has not worked in a congregate living setting.   She does not work or volunteer as healthcare worker or a  and does not work or volunteer in a healthcare facility.   Tiffany also has not lived in a congregate living setting in the past 14 days. She does not live with a healthcare worker.   Tiffany has had a close contact with a laboratory-confirmed COVID-19 patient within 14 days of symptom " onset. Additional information about contact with COVID-19 (Coronavirus) patient as reported by the patient (free text): I came in contact with two family members that tested positive for COVID-19. They got their test results back July 6 and July 11. Both tested positive and I was with them and in their home prior to.   Pertinent medical history  Tiffany does not get yeast infections when she takes antibiotics.   Tiffany needs a return to work/school note.   Weight: 146 lbs   Tiffany does not smoke or use smokeless tobacco.   She denies pregnancy and denies breastfeeding. She has menstruated in the past month.   Additional information as reported by the patient (free text): Just removed my Nexplanon.   Weight: 146 lbs    MEDICATIONS: No current medications, ALLERGIES: NKDA  Clinician Response:  Dear Tiffany,   Your symptoms show that you may have coronavirus (COVID-19). This illness can cause fever, cough and trouble breathing. Many people get a mild case and get better on their own. Some people can get very sick.  What should I do?  We would like to test you for this virus.   1. Please call [phone] to schedule your visit. Explain that you were referred by [facility] to have a COVID-19 test. Be ready to share your [facility] visit ID number.  The following will serve as your written order for this COVID Test, ordered by me, for the indication of suspected COVID [Z20.828]: The test will be ordered in [employer], our electronic health record, after you are scheduled. It will show as ordered and authorized by Kamran Renner MD.  Order: COVID-19 (Coronavirus) PCR for SYMPTOMATIC testing from [facility].      2. When it's time for your COVID test:  Stay at least 6 feet away from others. (If someone will drive you to your test, stay in the backseat, as far away from the  as you can.)   Cover your mouth and nose with a mask, tissue or washcloth.  Go straight to the testing site. Don't make any stops on the way there or back.       "3.Starting now: Stay home and away from others (self-isolate) until:   You've had no fever---and no medicine that reduces fever---for 3 full days (72 hours). And...   Your other symptoms have gotten better. For example, your cough or breathing has improved. And...   At least 10 days have passed since your symptoms started.       During this time, don't leave the house except for testing or medical care.   Stay in your own room, even for meals. Use your own bathroom if you can.   Stay away from others in your home. No hugging, kissing or shaking hands. No visitors.  Don't go to work, school or anywhere else.    Clean \"high touch\" surfaces often (doorknobs, counters, handles, etc.). Use a household cleaning spray or wipes. You'll find a full list of  on the EPA website: www.epa.gov/pesticide-registration/list-n-disinfectants-use-against-sars-cov-2.   Cover your mouth and nose with a mask, tissue or washcloth to avoid spreading germs.  Wash your hands and face often. Use soap and water.  Caregivers in these groups are at risk for severe illness due to COVID-19:  o People 65 years and older  o People who live in a nursing home or long-term care facility  o People with chronic disease (lung, heart, cancer, diabetes, kidney, liver, immunologic)  o People who have a weakened immune system, including those who:   Are in cancer treatment  Take medicine that weakens the immune system, such as corticosteroids  Had a bone marrow or organ transplant  Have an immune deficiency  Have poorly controlled HIV or AIDS  Are obese (body mass index of 40 or higher)  Smoke regularly   o Caregivers should wear gloves while washing dishes, handling laundry and cleaning bedrooms and bathrooms.  o Use caution when washing and drying laundry: Don't shake dirty laundry, and use the warmest water setting that you can.  o For more tips, go to www.cdc.gov/coronavirus/2019-ncov/downloads/10Things.pdf.    4.Sign up for Zarina Mendoza. We know " it's scary to hear that you might have COVID-19. We want to track your symptoms to make sure you're okay over the next 2 weeks. Please look for an email from [employer]---this is a free, online program that we'll use to keep in touch. To sign up, follow the link in the email. Learn more at [email or web address]  How can I take care of myself?   Get lots of rest. Drink extra fluids (unless a doctor has told you not to).   Take Tylenol (acetaminophen) for fever or pain. If you have liver or kidney problems, ask your family doctor if it's okay to take Tylenol.   Adults can take either:    650 mg (two 325 mg pills) every 4 to 6 hours, or...   1,000 mg (two 500 mg pills) every 8 hours as needed.    Note: Don't take more than 3,000 mg in one day. Acetaminophen is found in many medicines (both prescribed and over-the-counter medicines). Read all labels to be sure you don't take too much.   For children, check the Tylenol bottle for the right dose. The dose is based on the child's age or weight.    If you have other health problems (like cancer, heart failure, an organ transplant or severe kidney disease): Call your specialty clinic if you don't feel better in the next 2 days.       Know when to call 911. Emergency warning signs include:    Trouble breathing or shortness of breath Pain or pressure in the chest that doesn't go away Feeling confused like you haven't felt before, or not being able to wake up Bluish-colored lips or face.  Where can I get more information?    [employer] [place] -- About COVID-19: [email or web address]   CDC -- What to Do If You're Sick: www.cdc.gov/coronavirus/2019-ncov/about/steps-when-sick.html   CDC -- Ending Home Isolation: www.cdc.gov/coronavirus/2019-ncov/hcp/disposition-in-home-patients.html   CDC -- Caring for Someone: www.cdc.gov/coronavirus/2019-ncov/if-you-are-sick/care-for-someone.html   [facility] -- Interim Guidance for Hospital Discharge to Home:  www.health.[facility].[place]/diseases/coronavirus/hcp/hospdischarge.pdf   [facility] clinical trials (COVID-19 research studies): clinicalaffairs.[facility]/umn-clinical-trials    Below are the COVID-19 hotlines at the [facility] of Health ([facility]). Interpreters are available.    For health questions: Call 616-107-1279 or 1-948.595.5087 (7 a.m. to 7 p.m.) For questions about schools and childcare: Call 718-931-4268 or 1-755.236.9160 (7 a.m. to 7 p.m.)    Diagnosis: Myalgia  Diagnosis ICD: M79.1

## 2020-07-15 DIAGNOSIS — Z20.822 SUSPECTED COVID-19 VIRUS INFECTION: Primary | ICD-10-CM

## 2020-07-15 PROCEDURE — U0003 INFECTIOUS AGENT DETECTION BY NUCLEIC ACID (DNA OR RNA); SEVERE ACUTE RESPIRATORY SYNDROME CORONAVIRUS 2 (SARS-COV-2) (CORONAVIRUS DISEASE [COVID-19]), AMPLIFIED PROBE TECHNIQUE, MAKING USE OF HIGH THROUGHPUT TECHNOLOGIES AS DESCRIBED BY CMS-2020-01-R: HCPCS | Performed by: FAMILY MEDICINE

## 2020-07-15 PROCEDURE — 99207 ZZC NO BILLABLE SERVICE THIS VISIT: CPT

## 2020-07-15 NOTE — LETTER
July 20, 2020        Tiffany Munoz  [address] [place] [address]    This letter provides a written record that you were tested for COVID-19 on 7/15/20       Your result was negative. This means that we didn t find the virus that causes COVID-19 in your sample. A test may show negative when you do actually have the virus. This can happen when the virus is in the early stages of infection, before you feel illness symptoms.    If you have symptoms   Stay home and away from others (self-isolate) until you meet ALL of the guidelines below:    You ve had no fever--and no medicine that reduces fever--for 3 full days (72 hours). And      Your other symptoms have gotten better. For example, your cough or breathing has improved. And     At least 10 days have passed since your symptoms started.    During this time:    Stay home. Don t go to work, school or anywhere else.     Stay in your own room, including for meals. Use your own bathroom if you can.    Stay away from others in your home. No hugging, kissing or shaking hands. No visitors.    Clean  high touch  surfaces often (doorknobs, counters, handles, etc.). Use a household cleaning spray or wipes. You can find a full list on the EPA website at www.epa.gov/pesticide-registration/list-n-disinfectants-use-against-sars-cov-2.    Cover your mouth and nose with a mask, tissue or washcloth to avoid spreading germs.    Wash your hands and face often with soap and water.    Going back to work  Check with your employer for any guidelines to follow for going back to work.    Employers: This document serves as formal notice that your employee tested negative for COVID-19, as of the testing date shown above.

## 2020-07-16 LAB
SARS-COV-2 RNA SPEC QL NAA+PROBE: NOT DETECTED
SPECIMEN SOURCE: NORMAL

## 2020-10-29 ENCOUNTER — OFFICE VISIT (OUTPATIENT)
Dept: FAMILY MEDICINE | Facility: CLINIC | Age: 25
End: 2020-10-29
Payer: COMMERCIAL

## 2020-10-29 VITALS
TEMPERATURE: 99.5 F | WEIGHT: 156 LBS | SYSTOLIC BLOOD PRESSURE: 120 MMHG | RESPIRATION RATE: 16 BRPM | DIASTOLIC BLOOD PRESSURE: 76 MMHG | BODY MASS INDEX: 22.38 KG/M2 | HEART RATE: 78 BPM | OXYGEN SATURATION: 99 %

## 2020-10-29 DIAGNOSIS — N63.0 LUMP OR MASS IN BREAST: Primary | ICD-10-CM

## 2020-10-29 DIAGNOSIS — Z23 NEED FOR PROPHYLACTIC VACCINATION AND INOCULATION AGAINST INFLUENZA: ICD-10-CM

## 2020-10-29 PROCEDURE — 99214 OFFICE O/P EST MOD 30 MIN: CPT | Mod: 25 | Performed by: PHYSICIAN ASSISTANT

## 2020-10-29 PROCEDURE — 90686 IIV4 VACC NO PRSV 0.5 ML IM: CPT | Performed by: PHYSICIAN ASSISTANT

## 2020-10-29 PROCEDURE — 90471 IMMUNIZATION ADMIN: CPT | Performed by: PHYSICIAN ASSISTANT

## 2020-10-29 NOTE — PATIENT INSTRUCTIONS
Please call to schedule your mammogram:    [facility]:  [address]  [place] [address]  Call [phone] to schedule a mammogram or call the appointment line at [phone].  Walk in appointments for screening mammograms welcome.    [facility]  [address] [place] [address]  Call [phone] to schedule a mammogram or call the appointment line at [phone].  Walk in appointments for screening mammograms welcome.

## 2020-10-29 NOTE — PROGRESS NOTES
Subjective     Tiffany Munoz is a 25 year old female who presents to clinic today for the following health issues:    HPI         Breast Concern  Onset/Duration: about a week  Description:   Location: lower inner quadrant, left  Pain or tenderness: no  Redness: no  Intensity: moderate  Progression of Symptoms: same  Accompanying Signs & Symptoms:  Any lumps in axillary region:  no   Movable:  no   Nipple discharge: none  Changes in the skin or nipple: none  On Hormone therapy: birth control pill, new in the last couple months  Does it change with menstrual cycle: unknown  Previous history of similar problem: yes  First degree relative with breast cancer: Maternal Grandma   Precipitating factors:           Worsened by: none  Alleviating factors:            Improved by: none  Therapies tried and outcome: None  No LMP recorded. 10/18/20  Recent Labs   Lab Test 03/16/17  1905 12/11/14  1325 05/21/14  0942   ALT 19  --   --    CR 0.66  --   --    GFRESTIMATED >90  Non  GFR Calc    --   --    GFRESTBLACK >90   GFR Calc    --   --    POTASSIUM 3.6  --   --    TSH  --  1.64 1.50      BP Readings from Last 3 Encounters:   10/29/20 120/76   07/02/20 104/78   11/26/19 124/70    Wt Readings from Last 3 Encounters:   10/29/20 70.8 kg (156 lb)   07/02/20 66.2 kg (146 lb)   11/26/19 71.7 kg (158 lb)                      Reviewed and updated as needed this visit by Provider  Tobacco  Allergies  Meds  Problems  Med Hx  Surg Hx  Fam Hx          Additional complaints: None    HPI additional notes: Tiffany presents today with   Chief Complaint   Patient presents with     Breast Mass     Imm/Inj     Flu Shot   Does monthly breast exams, noticed small hard lump on llq of left breast.         Review of Systems   C: Negative for fever, chills, recent change in weight  Skin: Negative for worrisome rashes or lesions  Breast: Positive for lump  Resp: Negative for significant cough or SOB  CV:  Negative for chest pain or peripheral edema  MS: Negative for significant arthralgias or myalgias  P: Negative for changes in mood or affect  ROS all other systems negative.        Objective    /76   Pulse 78   Temp 99.5  F (37.5  C)   Resp 16   Wt 70.8 kg (156 lb)   SpO2 99%   BMI 22.38 kg/m    Body mass index is 22.38 kg/m .  Physical Exam   GENERAL: healthy, alert, in no acute distress  BREAST: no palpable axillary masses or adenopathy and 5 mm mobile mass left breast llq, fibrocystic  SKIN: no suspicious lesions, no rashes  PSYCH: Alert and oriented times 3;  Able to articulate logical thoughts. Affect is normal.    Diagnostic test results: none         Assessment & Plan         ICD-10-CM    1. Lump or mass in breast  N63.0 MA Diagnostic Digital Left     US Breast Left Complete 4 Quadrants   2. Need for prophylactic vaccination and inoculation against influenza  Z23 INFLUENZA VACCINE IM > 6 MONTHS VALENT IIV4 [23234]     Pt has fibrocystic breasts, likely benign finding but as is new to pt will get US and Mammogram to be sure.  Orders placed, pt given scheduling information.  Please see patient instructions for treatment details.    Return in about 2 days (around 10/31/2020) for Imaging.    Niyah Boateng PA-C  [facility]

## 2020-12-03 ENCOUNTER — HOSPITAL ENCOUNTER (OUTPATIENT)
Dept: MAMMOGRAPHY | Facility: CLINIC | Age: 25
End: 2020-12-03
Attending: PHYSICIAN ASSISTANT
Payer: COMMERCIAL

## 2020-12-03 PROCEDURE — 76642 ULTRASOUND BREAST LIMITED: CPT | Mod: LT

## 2020-12-11 ENCOUNTER — TELEPHONE (OUTPATIENT)
Dept: FAMILY MEDICINE | Facility: CLINIC | Age: 25
End: 2020-12-11

## 2020-12-11 NOTE — TELEPHONE ENCOUNTER
----- Message from Niyah Boateng PA-C sent at 12/11/2020 10:53 AM CST -----  Please contact pt.  They are due for Phq-9, pt hasn't checked MyC to answer previously sent questionnaire..

## 2020-12-31 ENCOUNTER — TELEPHONE (OUTPATIENT)
Dept: FAMILY MEDICINE | Facility: CLINIC | Age: 25
End: 2020-12-31

## 2020-12-31 NOTE — TELEPHONE ENCOUNTER
----- Message from Niyah Boateng PA-C sent at 12/31/2020  1:51 PM CST -----  Please contact pt.  They are due for Phq-9, pt hasn't checked MyC to answer previously sent questionnaire..

## 2021-01-15 ENCOUNTER — HEALTH MAINTENANCE LETTER (OUTPATIENT)
Age: 26
End: 2021-01-15

## 2021-05-11 ENCOUNTER — HOSPITAL ENCOUNTER (OUTPATIENT)
Dept: MAMMOGRAPHY | Facility: CLINIC | Age: 26
Discharge: HOME | End: 2021-05-11
Attending: PHYSICIAN ASSISTANT | Admitting: PHYSICIAN ASSISTANT
Payer: COMMERCIAL

## 2021-05-11 DIAGNOSIS — R92.8 BI-RADS CATEGORY 3 MAMMOGRAM RESULT: ICD-10-CM

## 2021-05-11 PROCEDURE — 76642 ULTRASOUND BREAST LIMITED: CPT | Mod: LT

## 2021-05-14 ENCOUNTER — HOSPITAL ENCOUNTER (OUTPATIENT)
Dept: MAMMOGRAPHY | Facility: CLINIC | Age: 26
Discharge: HOME | End: 2021-05-14
Attending: PHYSICIAN ASSISTANT | Admitting: RADIOLOGY
Payer: COMMERCIAL

## 2021-05-14 DIAGNOSIS — R92.8 BI-RADS CATEGORY 3 MAMMOGRAM RESULT: ICD-10-CM

## 2021-05-14 PROCEDURE — 88305 TISSUE EXAM BY PATHOLOGIST: CPT | Mod: 26 | Performed by: PATHOLOGY

## 2021-05-14 PROCEDURE — 250N000009 HC RX 250: Performed by: PHYSICIAN ASSISTANT

## 2021-05-14 PROCEDURE — 272N000031 US BREAST BIOPSY CORE NEEDLE LEFT

## 2021-05-14 PROCEDURE — 88305 TISSUE EXAM BY PATHOLOGIST: CPT | Mod: TC | Performed by: PHYSICIAN ASSISTANT

## 2021-05-14 RX ADMIN — LIDOCAINE HYDROCHLORIDE 5 ML: 10 INJECTION, SOLUTION INFILTRATION; PERINEURAL at 08:51

## 2021-05-14 NOTE — DISCHARGE INSTRUCTIONS

## 2021-05-17 ENCOUNTER — TELEPHONE (OUTPATIENT)
Dept: MAMMOGRAPHY | Facility: CLINIC | Age: 26
End: 2021-05-17

## 2021-05-17 LAB — COPATH REPORT: NORMAL

## 2021-05-17 NOTE — TELEPHONE ENCOUNTER
After review by Breast Center Radiologist, Dr. Montez Gonzalez, Ms. Munoz was called,  verified, and given her 2021 Left Breast Biopsy results (FA) and recommended Follow up (Clinical).  Biopsy site without issues or concerns.  Ms. Munoz may decide to have the Fibroadenoma removed.  Surgical consultants number given to patient should she decide to proceed with surgical consult and removal.   I encouraged her to contact her doctor with any further breast concerns.

## 2021-06-14 ENCOUNTER — TELEPHONE (OUTPATIENT)
Dept: FAMILY MEDICINE | Facility: CLINIC | Age: 26
End: 2021-06-14

## 2021-06-14 NOTE — TELEPHONE ENCOUNTER
Patient Quality Outreach      Summary:    Patient has the following on her problem list/HM:     Depression / Dysthymia review    6 Month Remission: 4-8 month window range: no  12 Month Remission: 10-14 month window range: no       PHQ-9 SCORE 5/4/2018 3/14/2019 11/18/2019   PHQ-9 Total Score - - -   PHQ-9 Total Score MyChart - 2 (Minimal depression) -   PHQ-9 Total Score 13 2 12       If PHQ-9 recheck is 5 or more, route to provider for next steps.    Patient is due/failing the following:   PHQ-9 Needed    Type of outreach:    Sent Look[employer] message.    Questions for provider review:    None                                                                                                                                     Sean Chambers [facility]           Chart routed to Care Team.

## 2021-09-05 ENCOUNTER — HEALTH MAINTENANCE LETTER (OUTPATIENT)
Age: 26
End: 2021-09-05

## 2021-12-14 ENCOUNTER — E-VISIT (OUTPATIENT)
Dept: URGENT CARE | Facility: URGENT CARE | Age: 26
End: 2021-12-14
Payer: COMMERCIAL

## 2021-12-14 DIAGNOSIS — Z20.822 SUSPECTED COVID-19 VIRUS INFECTION: Primary | ICD-10-CM

## 2021-12-14 PROCEDURE — 99421 OL DIG E/M SVC 5-10 MIN: CPT | Performed by: PHYSICIAN ASSISTANT

## 2021-12-14 NOTE — PATIENT INSTRUCTIONS
Dear Tiffany Munoz,    Your symptoms show that you may have coronavirus (COVID-19). This illness can cause fever, cough and trouble breathing. Many people get a mild case and get better on their own. Some people can get very sick.    Will I be tested for COVID-19?  We would like to test you for Covid-19 virus. I have placed orders for this test.     To schedule: go to your [employer] home page and scroll down to the section that says  You have an appointment that needs to be scheduled  and click the large green button that says  Schedule Now  and follow the steps to find the next available openings.    If you are unable to complete these [facility] scheduling steps, please call [phone] to schedule your testing.     Return to work/school/ guidance:  Please let your workplace manager and staffing office know when your quarantine ends     We can t give you an exact date as it depends on the above. You can calculate this on your own or work with your manager/staffing office to calculate this. (For example if you were exposed on 10/4, you would have to quarantine for 14 full days. That would be through 10/18. You could return on 10/19.)      If you receive a positive COVID-19 test result, follow the guidance of the those who are giving you the results. Usually the return to work is 10 (or in some cases 20 days from symptom onset.) If you work at [facility], you must also be cleared by Employee Occupational Health and Safety to return to work.        If you receive a negative COVID-19 test result and did not have a high risk exposure to someone with a known positive COVID-19 test, you can return to work once you're free of fever for 24 hours without fever-reducing medication and your symptoms are improving or resolved.      If you receive a negative COVID-19 test and If you had a high risk exposure to someone who has tested positive for COVID-19 then you can return to work 14 days after your last  contact with the positive individual    Note: If you have ongoing exposure to the covid positive person, this quarantine period may be more than 14 days. (For example, if you are continued to be exposed to your child who tested positive and cannot isolate from them, then the quarantine of 7-14 days can't start until your child is no longer contagious. This is typically 10 days from onset of the child's symptoms. So the total duration may be 17-24 days in this case.)    Sign up for [employer].   We know it's scary to hear that you might have COVID-19. We want to track your symptoms to make sure you're okay over the next 2 weeks. Please look for an email from [employer]--this is a free, online program that we'll use to keep in touch. To sign up, follow the link in the email you will receive. Learn more at [email or web address]    How can I take care of myself?    Get lots of rest. Drink extra fluids (unless a doctor has told you not to)    Take Tylenol (acetaminophen) or ibuprofen for fever or pain. If you have liver or kidney problems, ask your family doctor if it's okay to take Tylenol o ibuprofen    If you have other health problems (like cancer, heart failure, an organ transplant or severe kidney disease): Call your specialty clinic if you don't feel better in the next 2 days.    Know when to call 911. Emergency warning signs include:  o Trouble breathing or shortness of breath  o Pain or pressure in the chest that doesn't go away  o Feeling confused like you haven't felt before, or not being able to wake up  o Bluish-colored lips or face    Where can I get more information?  [facility] - About COVID-19:   [email or web address]    CDC - What to Do If You're Sick:   www.cdc.gov/coronavirus/2019-ncov/about/steps-when-sick.html    December 14, 2021  RE:  Tiffany Munoz                                                                                                                   [birth year][address] [place], [place] [address]      To whom it may concern:    I evaluated Tiffany Munoz on December 14, 2021. Tiffany Munoz should be excused from work/school.     They should let their workplace manager and staffing office know when their quarantine ends.    We can not give an exact date as it depends on the information below. They can calculate this on their own or work with their manager/staffing office to calculate this. (For example if they were exposed on 10/04, they would have to quarantine for 14 full days. That would be through 10/18. They could return on 10/19.)    Quarantine Guidelines:      If patient receives a positive COVID-19 test result, they should follow the guidance of those who are giving the results. Usually the return to work is 10 (or in some cases 20 days from symptom onset.) If they work at [employer], they must be cleared by Employee Occupational Health and Safety to return to work.        If patient receives a negative COVID-19 test result and did not have a high risk exposure to someone with a known positive COVID-19 test, they can return to work once they're free of fever for 24 hours without fever-reducing medication and their symptoms are improving or resolved.      If patient receives a negative COVID-19 test and if they had a high risk exposure to someone who has tested positive for COVID-19 then they can return to work 14 days after their last contact with the positive individual    Note: If there is ongoing exposure to the covid positive person, this quarantine period may be longer than 14 days. (For example, if they are continually exposed to their child, who tested positive and cannot isolate from them, then the quarantine of 7-14 days can't start until their child is no longer contagious. This is typically 10 days from onset to the child's symptoms. So the total duration may be 17-24 days in this case.)     Sincerely,  Alfie Romero,  MARKY

## 2021-12-15 ENCOUNTER — LAB (OUTPATIENT)
Dept: URGENT CARE | Facility: URGENT CARE | Age: 26
End: 2021-12-15
Attending: PHYSICIAN ASSISTANT
Payer: COMMERCIAL

## 2021-12-15 DIAGNOSIS — Z20.822 SUSPECTED COVID-19 VIRUS INFECTION: ICD-10-CM

## 2021-12-15 LAB — SARS-COV-2 RNA RESP QL NAA+PROBE: NEGATIVE

## 2021-12-15 PROCEDURE — U0005 INFEC AGEN DETEC AMPLI PROBE: HCPCS

## 2021-12-15 PROCEDURE — U0003 INFECTIOUS AGENT DETECTION BY NUCLEIC ACID (DNA OR RNA); SEVERE ACUTE RESPIRATORY SYNDROME CORONAVIRUS 2 (SARS-COV-2) (CORONAVIRUS DISEASE [COVID-19]), AMPLIFIED PROBE TECHNIQUE, MAKING USE OF HIGH THROUGHPUT TECHNOLOGIES AS DESCRIBED BY CMS-2020-01-R: HCPCS

## 2021-12-30 ENCOUNTER — VIRTUAL VISIT (OUTPATIENT)
Dept: FAMILY MEDICINE | Facility: CLINIC | Age: 26
End: 2021-12-30
Payer: COMMERCIAL

## 2021-12-30 ENCOUNTER — NURSE TRIAGE (OUTPATIENT)
Dept: NURSING | Facility: CLINIC | Age: 26
End: 2021-12-30
Payer: COMMERCIAL

## 2021-12-30 VITALS — OXYGEN SATURATION: 98 %

## 2021-12-30 DIAGNOSIS — U07.1 COVID-19: Primary | ICD-10-CM

## 2021-12-30 PROCEDURE — 99213 OFFICE O/P EST LOW 20 MIN: CPT | Mod: 95 | Performed by: FAMILY MEDICINE

## 2021-12-30 RX ORDER — ALBUTEROL SULFATE 90 UG/1
2 AEROSOL, METERED RESPIRATORY (INHALATION) EVERY 6 HOURS
Qty: 18 G | Refills: 0 | Status: SHIPPED | OUTPATIENT
Start: 2021-12-30 | End: 2022-06-08

## 2021-12-30 NOTE — PATIENT INSTRUCTIONS
Patient Education     Using an Inhaler with a Spacer  To control asthma, you need to use your medicines the right way. Some medicines are inhaled using a device called a metered-dose inhaler (MDI). MDIs deliver medicine with a fine spray. Your healthcare provider has prescribed a special chamber or spacer to use with your inhaler. A spacer increases the amount of medicine that goes to your lungs. It may make your medicine work better.   Steps for using an inhaler with a spacer  Step 1:    Wash your hands.    Check the expiration date and the counter of the inhaler, if present.    Remove the cap from the inhaler.    Shake the inhaler well. If the inhaler is being used for the first time or has not been used for a while, prime it as directed by the product maker. Make sure to prime the inhaler in the air away from your face.    Check to make sure the metal canister is put correctly into the plastic boot, or castañeda of the inhaler. See the package insert for instructions.    Attach the spacer to the inhaler. Then remove the cap from the spacer mouthpiece.  Step 2:    Breathe out normally, away from the spacer.    Put the mouthpiece of the spacer past your teeth and above your tongue. Close your lips tightly around it. If you are using a spacer with a mask, make sure the mask covers your nose and mouth with a good seal against your chin and cheeks. Make sure there is no space between your skin and the mask.    Keep your chin up or level.  Step 3:    Press the canister on the inhaler 1 time to release the medicine    Then breathe in through your mouth as slowly and deeply as you can. This should take about 5 to 10  seconds. If you breathe too quickly, you may hear a whistling sound in certain spacers.  Step 4:    Take the spacer mouthpiece out of your mouth. Close your lips.    Hold your breath up to 10 seconds if you are able.    Then hold your lips together and slowly breathe out through your mouth.    After using your  inhaler, rinse your mouth with water by swishing, gargling, and spitting out the water. Never swallow it. Inhaled corticosteroids can cause a fungal infection called thrush in your mouth and throat.        If you re prescribed more than 1 puff of medicine at a time, wait up to 60 seconds, or as directed by your healthcare provider, between puffs. This number may be different for different medicines. Shake the inhaler again. Then repeat steps 2 to 4.   [employer] last reviewed this educational content on 5/1/2019 2000-2021 [employer]. All rights reserved. This information is not intended as a substitute for professional medical care. Always follow your healthcare professional's instructions.         Patient Education       Coronavirus Disease 2019 (COVID-19): Caring for Yourself or Others   If you or a household member have symptoms of COVID-19, follow these guidelines for preventing spread of the virus and managing symptoms. This is regardless of your vaccination status.   If you have COVID-19 symptoms    Stay home. Call your healthcare provider and tell them you have symptoms.. Do this before going to any hospital or clinic. Follow your provider's instructions. You may be advised to isolate yourself at home. This is called self-isolation. You may also be told to stay at least 6 feet from others to prevent the spread of COVID-19. This is called social distancing.    Stay away from work, school, and public places. Limit physical contact with family members. Limit visitors. Don't kiss anyone or share eating or drinking utensils. Clean surfaces you touch with disinfectant. This is to help prevent the virus from spreading.    If you need to cough or sneeze, do it into a tissue. Then throw the tissue into the trash. If you don't have tissues, cough or sneeze into the bend of your elbow.    Wear a cloth face mask with 2 or more layers of washable, breathable fabric and a nose wire while in public or when  indoors with people who don't live with you. Or you can wear a disposable paper mask with a cloth mask on top. Wear the mask so that it covers both your nose and mouth.    Don t share food or personal items with people in your household. This includes items like eating and drinking utensils, towels, and bedding.    If you need to go to a hospital or clinic, expect that the healthcare staff will wear protective equipment such as masks, gowns, gloves, and eye protection. You may be advised to wait in or enter through a separate area. This is to prevent the possible virus from spreading.    Tell the healthcare staff about recent travel. This includes local travel on public transport. Staff may need to find other people you have been in contact with.    Follow all instructions the healthcare staff give you.    If you have been diagnosed with COVID-19    Stay home and start self-isolation. Don t leave your home unless you need to get medical care. Don't go to work, school, or public areas. Don't use public transportation or taxis.    Follow all instructions from your healthcare provider. Call your healthcare provider s office before going. They can prepare and give you instructions. This will help prevent the virus from spreading.    If you need to go to a hospital or clinic, expect that the healthcare staff will wear protective equipment such as masks, gowns, gloves, and eye protection. You may be advised to wait in or enter through a separate area. This is to prevent the possible virus from spreading.    Wear a face mask with 2 or more layers and a nose wire. Use either a cloth mask with layers of tightly woven, breathable fabric or a disposable paper mask with a cloth mask on top. This is to protect other people from your germs. If you are not able to wear a mask, your caregivers should. Wear the mask so that it covers both your nose and mouth.    Stay away from other people in your home.    Stay away from pets and  other animals.    Don t share food or personal items with people in your household. This includes items like eating and drinking utensils, towels, and bedding.    If you need to cough or sneeze, do it into a tissue. Then throw the tissue into the trash. If you don't have tissues, cough or sneeze into the bend of your elbow.    Wash your hands often.    Self-care at home   Prevention  Several vaccines are available to prevent COVID-19 or reduce its severity. These vaccine reduce how severe the illness will be if you get the virus. No vaccine is ever 100% effective in preventing any illness, but the COVID-19 vaccines work well and are safe. One vaccine is available for people as young as 5. Expert groups, including ACOG and CDC, advise pregnant or breastfeeding people to be vaccinated. Talk with your healthcare provider about which vaccine is best for you and your family.   Treatment  Current treatment is mainly aimed at helping your body while it fights the virus. This is known as supportive care. For serious COVID-19, you may need to stay in the hospital. Supportive care includes:     Getting rest. This helps your body fight the illness.    Staying hydrated.  Drinking liquids is the best way to prevent dehydration. Try to drink 6 to 8 glasses of liquids every day, or as advised by your provider. Also check with your provider about which fluids are best for you. Don't drink fluids that contain caffeine or alcohol.    Taking over-the-counter (OTC) pain medicine. These are used to help ease pain and reduce fever. Follow your healthcare provider's instructions for which OTC medicine to use.  If you've been treated for suspected or confirmed COVID-19 , follow all of your healthcare team's instructions. This will include when it's OK to stop self-isolation. You may also get instructions on position changes to help your breathing, such as lying on your belly (prone positioning). If you were treated at a hospital and  discharged, you may be sent home with a pulse oximeter. This is a small electronic device that you clip on your fingertip. It measures the amount of oxygen in your body. Follow your healthcare team's instructions on its use, how they will be in touch with you, and let you know when to call them.   The FDA approved monoclonal antibody therapy for emergency investigational use in certain people who have a positive COVID-19 viral test and have mild to moderate symptoms but are not in the hospital. Your healthcare provider will advise you on whether monoclonal antibody therapy is appropriate for you. It's not approved to prevent COVID-19. It's approved for people 12 years and older who weigh at least 88 pounds (about 40 kgs) and are at high risk for severe COVID-19 and a hospital stay. This includes people who are 65 years and older and people with certain chronic conditions. Monoclonal antibody therapy is not approved for people who:     Are in the hospital with COVID-19, or    Need oxygen therapy for COVID-19, or    Need oxygen therapy for a chronic condition and need to have oxygen flow increased because of COVID-19    If you've had confirmed COVID-19, your healthcare team may ask you to consider donating your plasma. This is called COVID-19 convalescent plasma donation. Plasma from people fully recovered from COVID-19 may contain antibodies to help fight COVID-19 in people who are currently seriously ill with the disease. Experts don't know the safety of COVID-19 convalescent plasma or how well it works. Research continues. The FDA has approved it for emergency use in certain people with serious or life-threatening COVID-19.   Home care for a sick person     Follow all instructions from healthcare staff.    Wash your hands often.    Wear protective clothing as advised.    Make sure the sick person wears a mask. If they can't wear a mask, don't stay in the same room with the person. If you must be in the same room,  wear a face mask. When wearing a mask, make sure that it covers both the nose and mouth.    Keep track of the sick person s symptoms.    Clean home surfaces often with disinfectant. This includes phones, kitchen counters, fridge door handle, bathroom surfaces, and others.    Don t let anyone share household items with the sick person. This includes eating and drinking tools, towels, sheets, or blankets.    Clean fabrics and laundry thoroughly.    Keep other people and pets away from the sick person.    When you can stop self-isolation  When you are sick with COVID-19, you should stay away from other people. This is called self-isolation.   For normally healthy children or adults with COVID-19 symptoms, CDC advises stopping self-isolation when all 3 of these are true:   1. You have had no fever for at least 24 hours. This means no fever without medicine that reduces fever, such as acetaminophen, for at least 24 hours.  2. Your symptoms such as cough or trouble breathing have improved.  3. It has been at least 10 days since your first symptoms started.  For people who have COVID-19 but no symptoms , isolation can stop 10 days after the first positive test.   If you have a weak immune system and COVID-19, or if you've had severe COVID-19,  your instructions on when to stop isolation will be somewhat different. Some conditions and treatments can cause a weak immune system. These include cancer treatment, bone marrow or organ transplants, and conditions such as HIV or other immune system disorders. You may be advised to self-isolate up to 20 days after your symptoms first started. Your healthcare provider may want to retest you for COVID-19. Follow your provider's instructions.   CDC mask guidance  Consider the CDC's guidance and your local community's instructions on face masks.       Cloth masks may help prevent people who have COVID-19 from spreading the virus to others.    Cloth masks are most likely to reduce  COVID-19 spread when masks are widely used by people who are out in the public.    Wear a mask inside your house if you live with someone who has symptoms of COVID-19 or has tested positive for COVID-19.    CDC advises all people older than 2 who are not fully vaccinated to wear a mask and stay 6 feet away from others while in public.    CDC advises people with a weak immune system, even if fully vaccinated, to continue wearing masks and to stay 6 feet away from others while in public.    In  through grade 12 classes, CDC advises indoor masking for all teachers, staff, students ages 2 and older, and visitors to schools, regardless of vaccination status.    Like other viruses, the virus that causes COVID-19 changes (mutates) all the time. This leads to variants that are easily spread, including the delta variant. To protect against variants, CDC advises all people over age 2, including those who are fully vaccinated, to wear a mask indoors in public settings if you are in an area of high numbers of COVID-19 cases. See the [facility]'s county data website for current transmission information in your area.    Certain people should not wear a face mask. This includes:     Children younger than 2 years old    Anyone with a health, developmental, or mental health condition that can be made worse by wearing a mask    Anyone who is unconscious or unable to remove the face covering without help    See the CDC's mask guidance.   When to call your healthcare provider  Call your healthcare provider right away if a sick person has any of these:     Trouble breathing    Pain or pressure in chest  If a sick person has any of these, call 911:    Trouble breathing that gets worse    Pain or pressure in chest that gets worse    Blue tint to lips or face    Fast or irregular heartbeat    Confusion or trouble waking    Fainting or loss of consciousness    Coughing up blood  Going home from the hospital   If you were diagnosed with  COVID-19 and were recently discharged from the hospital:     Follow the instructions above for self-care and isolation.    Follow the hospital healthcare team s specific instructions.    Ask questions if anything is unclear to you. Write down answers so you remember them.  Date last modified: 11/3/2021  [employer] last reviewed this educational content on 4/1/2020 2000-2021 [employer]. All rights reserved. This information is not intended as a substitute for professional medical care. Always follow your healthcare professional's instructions.

## 2021-12-30 NOTE — PROGRESS NOTES
ASSESSMENT/PLAN:   Diagnoses and all orders for this visit:    COVID-19  -     albuterol (PROAIR HFA/PROVENTIL HFA/VENTOLIN HFA) 108 (90 Base) MCG/ACT inhaler; Inhale 2 puffs into the lungs every 6 hours  -     AEROCHAMBER    reviewed home care  Reviewed use of albuterol inhaler  Reviewed when to seek care if worsening.        No follow-ups on file.       ======================================================    SUBJECTIVE  Tiffany Munoz is a 26 year old   Who tested positive for covid yesterday.  She woke up this am and she had a severe sore throat, was having trouble breathing and was coughing up a lot of phlegm.  She had a fever of 101.3 yesterday and this am.  She has been taking dayquil.   She has done the [facility] monoclonal ab form.    The lowest o2 sat has been 96% and up to  when she is breathing deeply.   No current chest pain.   She did get her covid vaccines, but has not yet been boosted.      She had asthma as a child, and used an inhaler as a child.    She has not used an inhaler in many years, but says her sobr this am was similar to when she had asthma as a child.           ROS  Complete 10 point review of systems negative except as noted above in HPI      OBJECTIVE  SpO2 98%    Gen:  Nad, alert  No labored breathing  Able to speak in complete sentences.  Appears comfortable.    No visible rashes.    Current Outpatient Medications   Medication     albuterol (PROAIR HFA/PROVENTIL HFA/VENTOLIN HFA) 108 (90 Base) MCG/ACT inhaler     No current facility-administered medications for this visit.      Patient Active Problem List   Diagnosis     Contraception     Anxiety     Moderate episode of recurrent major depressive disorder (H) onset teens      Insomnia due to other mental disorder     Family history of malignant neoplasm of breast-pat & mat grdma      Breast pain-bilat-L>R since 15y/o      Large breasts        LABS & IMAGES   No results found for any visits on  12/30/21.      ======================================================    MDM    Video visit started at 3:50  Video visit ended at 405  Pt was at home.          Options for treatment and follow-up care were reviewed with the patient. Tiffany Munoz and/or guardian was engaged and actively involved in the decision making process. Tiffany Munoz and/or guardian verbalized understanding of the options discussed and was satisfied with the final plan.      Yamila Mora MD

## 2021-12-30 NOTE — TELEPHONE ENCOUNTER
Shortness of breath, cough, fever for a couple of days. Tested positive for coronavirus. I connected with scheduling for a virtual visit and gave her the [facility] phone number for monoclonal antibody information:  [phone].  Roseann Alva RN  [facility]      Reason for Disposition    [1] COVID-19 infection suspected by caller or triager AND [2] mild symptoms (cough, fever, or others) AND [3] negative COVID-19 rapid test    Additional Information    Negative: SEVERE difficulty breathing (e.g., struggling for each breath, speaks in single words)    Negative: Difficult to awaken or acting confused (e.g., disoriented, slurred speech)    Negative: Bluish (or gray) lips or face now    Negative: Shock suspected (e.g., cold/pale/clammy skin, too weak to stand, low BP, rapid pulse)    Negative: Sounds like a life-threatening emergency to the triager    Negative: [1] COVID-19 exposure AND [2] no symptoms    Negative: COVID-19 vaccine reaction suspected (e.g., fever, headache, muscle aches) occurring 1 to 3 days after getting vaccine    Negative: COVID-19 vaccine, questions about    Negative: [1] Lives with someone known to have influenza (flu test positive) AND [2] flu-like symptoms (e.g., cough, runny nose, sore throat, SOB; with or without fever)    Negative: [1] Adult with possible COVID-19 symptoms AND [2] triager concerned about severity of symptoms or other causes    Negative: COVID-19 and breastfeeding, questions about    Negative: SEVERE or constant chest pain or pressure (Exception: mild central chest pain, present only when coughing)    Negative: MODERATE difficulty breathing (e.g., speaks in phrases, SOB even at rest, pulse 100-120)    Negative: [1] Headache AND [2] stiff neck (can't touch chin to chest)     headache    Negative: MILD difficulty breathing (e.g., minimal/no SOB at rest, SOB with walking, pulse <100)    Negative: Chest pain or pressure    Negative: Patient sounds very sick or weak to  the triager    Negative: Fever > 103 F (39.4 C)     100.7    Negative: [1] Fever > 101 F (38.3 C) AND [2] age > 60 years    Negative: [1] Fever > 100.0 F (37.8 C) AND [2] bedridden (e.g., nursing home patient, CVA, chronic illness, recovering from surgery)    Negative: HIGH RISK for severe COVID complications (e.g., age > 64 years, obesity with BMI > 25, pregnant, chronic lung disease or other chronic medical condition)  (Exception: Already seen by PCP and no new or worsening symptoms.)    Negative: [1] HIGH RISK patient AND [2] influenza is widespread in the community AND [3] ONE OR MORE respiratory symptoms: cough, sore throat, runny or stuffy nose    Negative: [1] HIGH RISK patient AND [2] influenza exposure within the last 7 days AND [3] ONE OR MORE respiratory symptoms: cough, sore throat, runny or stuffy nose    Protocols used: CORONAVIRUS (COVID-19) DIAGNOSED OR [id number] 8.25.2021

## 2022-02-20 ENCOUNTER — HEALTH MAINTENANCE LETTER (OUTPATIENT)
Age: 27
End: 2022-02-20

## 2022-04-11 ENCOUNTER — APPOINTMENT (OUTPATIENT)
Dept: URBAN - METROPOLITAN AREA CLINIC 256 | Age: 27
Setting detail: DERMATOLOGY
End: 2022-04-13

## 2022-04-11 VITALS — HEIGHT: 70 IN | WEIGHT: 145 LBS

## 2022-04-11 DIAGNOSIS — L81.4 OTHER MELANIN HYPERPIGMENTATION: ICD-10-CM

## 2022-04-11 DIAGNOSIS — Z71.89 OTHER SPECIFIED COUNSELING: ICD-10-CM

## 2022-04-11 DIAGNOSIS — D18.0 HEMANGIOMA: ICD-10-CM

## 2022-04-11 DIAGNOSIS — L70.0 ACNE VULGARIS: ICD-10-CM

## 2022-04-11 DIAGNOSIS — L82.1 OTHER SEBORRHEIC KERATOSIS: ICD-10-CM

## 2022-04-11 DIAGNOSIS — D22 MELANOCYTIC NEVI: ICD-10-CM

## 2022-04-11 PROBLEM — D22.62 MELANOCYTIC NEVI OF LEFT UPPER LIMB, INCLUDING SHOULDER: Status: ACTIVE | Noted: 2022-04-11

## 2022-04-11 PROBLEM — D18.01 HEMANGIOMA OF SKIN AND SUBCUTANEOUS TISSUE: Status: ACTIVE | Noted: 2022-04-11

## 2022-04-11 PROCEDURE — 99203 OFFICE O/P NEW LOW 30 MIN: CPT

## 2022-04-11 PROCEDURE — OTHER MEDICATION COUNSELING: OTHER

## 2022-04-11 PROCEDURE — OTHER COUNSELING: OTHER

## 2022-04-11 PROCEDURE — OTHER MIPS QUALITY: OTHER

## 2022-04-11 PROCEDURE — OTHER PRESCRIPTION: OTHER

## 2022-04-11 RX ORDER — CLINDAMYCIN PHOSPHATE 10 MG/ML
1% SOLUTION TOPICAL QD
Qty: 60 | Refills: 0 | Status: ERX | COMMUNITY
Start: 2022-04-11

## 2022-04-11 RX ORDER — TRETIONIN 0.25 MG/G
0.025% CREAM TOPICAL QHS
Qty: 45 | Refills: 3 | Status: ERX | COMMUNITY
Start: 2022-04-11

## 2022-04-11 RX ORDER — CLINDAMYCIN PHOSPHATE 10 MG/ML
1% SOLUTION TOPICAL QD
Qty: 60 | Refills: 5 | Status: ERX | COMMUNITY
Start: 2022-04-11

## 2022-04-11 ASSESSMENT — LOCATION DETAILED DESCRIPTION DERM
LOCATION DETAILED: RIGHT INFERIOR CENTRAL MALAR CHEEK
LOCATION DETAILED: LEFT BUTTOCK
LOCATION DETAILED: RIGHT ANTERIOR PROXIMAL UPPER ARM
LOCATION DETAILED: LEFT VENTRAL PROXIMAL FOREARM
LOCATION DETAILED: EPIGASTRIC SKIN
LOCATION DETAILED: PERIUMBILICAL SKIN
LOCATION DETAILED: LEFT ANTERIOR PROXIMAL UPPER ARM
LOCATION DETAILED: SUBXIPHOID

## 2022-04-11 ASSESSMENT — LOCATION ZONE DERM
LOCATION ZONE: TRUNK
LOCATION ZONE: ARM
LOCATION ZONE: FACE

## 2022-04-11 ASSESSMENT — LOCATION SIMPLE DESCRIPTION DERM
LOCATION SIMPLE: LEFT UPPER ARM
LOCATION SIMPLE: ABDOMEN
LOCATION SIMPLE: RIGHT CHEEK
LOCATION SIMPLE: LEFT BUTTOCK
LOCATION SIMPLE: RIGHT UPPER ARM
LOCATION SIMPLE: LEFT FOREARM

## 2022-04-11 NOTE — PROCEDURE: MEDICATION COUNSELING
Detail Level: Detailed Additional Notes: ave you tested positive for COVID-19 or been diagnosed with COVID-19? No.\\nAre you waiting for pending test results for COVID-19? No.\\nDo you have any of the following symptoms: fever, cough, difficulty breathing, muscle aches/soreness, loss of smell or taste, sore throat, or nausea or diarrhea? No.\\nHave you been in contact with anyone who has COVID-19? No.\\nIs there any reason you can’t wear a mask that covers your nose and mouth for the entire time that you are in the office? No.\\nHave you traveled to [place], [place], [place], [place], [place] or [place] in the last 14 days? No. Quality 110: Preventive Care And Screening: Influenza Immunization: Influenza Immunization Administered during Influenza season Glycopyrrolate Pregnancy And Lactation Text: This medication is Pregnancy Category B and is considered safe during pregnancy. It is unknown if it is excreted breast milk.

## 2022-04-11 NOTE — PROCEDURE: COUNSELING
Detail Level: Detailed
Dapsone Counseling: I discussed with the patient the risks of dapsone including but not limited to hemolytic anemia, agranulocytosis, rashes, methemoglobinemia, kidney failure, peripheral neuropathy, headaches, GI upset, and liver toxicity.  Patients who start dapsone require monitoring including baseline LFTs and weekly CBCs for the first month, then every month thereafter.  The patient verbalized understanding of the proper use and possible adverse effects of dapsone.  All of the patient's questions and concerns were addressed.
High Dose Vitamin A Pregnancy And Lactation Text: High dose vitamin A therapy is contraindicated during pregnancy and breast feeding.
Include Pregnancy/Lactation Warning?: No
Minocycline Pregnancy And Lactation Text: This medication is Pregnancy Category D and not consider safe during pregnancy. It is also excreted in breast milk.
Tetracycline Counseling: Patient counseled regarding possible photosensitivity and increased risk for sunburn.  Patient instructed to avoid sunlight, if possible.  When exposed to sunlight, patients should wear protective clothing, sunglasses, and sunscreen.  The patient was instructed to call the office immediately if the following severe adverse effects occur:  hearing changes, easy bruising/bleeding, severe headache, or vision changes.  The patient verbalized understanding of the proper use and possible adverse effects of tetracycline.  All of the patient's questions and concerns were addressed. Patient understands to avoid pregnancy while on therapy due to potential birth defects.
High Dose Vitamin A Counseling: Side effects reviewed, pt to contact office should one occur.
Minocycline Counseling: Patient advised regarding possible photosensitivity and discoloration of the teeth, skin, lips, tongue and gums.  Patient instructed to avoid sunlight, if possible.  When exposed to sunlight, patients should wear protective clothing, sunglasses, and sunscreen.  The patient was instructed to call the office immediately if the following severe adverse effects occur:  hearing changes, easy bruising/bleeding, severe headache, or vision changes.  The patient verbalized understanding of the proper use and possible adverse effects of minocycline.  All of the patient's questions and concerns were addressed.
Bactrim Pregnancy And Lactation Text: This medication is Pregnancy Category D and is known to cause fetal risk.  It is also excreted in breast milk.
Tazorac Counseling:  Patient advised that medication is irritating and drying.  Patient may need to apply sparingly and wash off after an hour before eventually leaving it on overnight.  The patient verbalized understanding of the proper use and possible adverse effects of tazorac.  All of the patient's questions and concerns were addressed.
Topical Sulfur Applications Counseling: Topical Sulfur Counseling: Patient counseled that this medication may cause skin irritation or allergic reactions.  In the event of skin irritation, the patient was advised to reduce the amount of the drug applied or use it less frequently.   The patient verbalized understanding of the proper use and possible adverse effects of topical sulfur application.  All of the patient's questions and concerns were addressed.
Spironolactone Counseling: Patient advised regarding risks of diarrhea, abdominal pain, hyperkalemia, birth defects (for female patients), liver toxicity and renal toxicity. The patient may need blood work to monitor liver and kidney function and potassium levels while on therapy. The patient verbalized understanding of the proper use and possible adverse effects of spironolactone.  All of the patient's questions and concerns were addressed.
Azelaic Acid Counseling: Patient counseled that medicine may cause skin irritation and to avoid applying near the eyes.  In the event of skin irritation, the patient was advised to reduce the amount of the drug applied or use it less frequently.   The patient verbalized understanding of the proper use and possible adverse effects of azelaic acid.  All of the patient's questions and concerns were addressed.
Isotretinoin Counseling: Patient should get monthly blood tests, not donate blood, not drive at night if vision affected, not share medication, and not undergo elective surgery for 6 months after tx completed. Side effects reviewed, pt to contact office should one occur.
Azithromycin Pregnancy And Lactation Text: This medication is considered safe during pregnancy and is also secreted in breast milk.
Birth Control Pills Pregnancy And Lactation Text: This medication should be avoided if pregnant and for the first 30 days post-partum.
Azithromycin Counseling:  I discussed with the patient the risks of azithromycin including but not limited to GI upset, allergic reaction, drug rash, diarrhea, and yeast infections.
Doxycycline Counseling:  Patient counseled regarding possible photosensitivity and increased risk for sunburn.  Patient instructed to avoid sunlight, if possible.  When exposed to sunlight, patients should wear protective clothing, sunglasses, and sunscreen.  The patient was instructed to call the office immediately if the following severe adverse effects occur:  hearing changes, easy bruising/bleeding, severe headache, or vision changes.  The patient verbalized understanding of the proper use and possible adverse effects of doxycycline.  All of the patient's questions and concerns were addressed.
Spironolactone Pregnancy And Lactation Text: This medication can cause feminization of the male fetus and should be avoided during pregnancy. The active metabolite is also found in breast milk.
Sarecycline Counseling: Patient advised regarding possible photosensitivity and discoloration of the teeth, skin, lips, tongue and gums.  Patient instructed to avoid sunlight, if possible.  When exposed to sunlight, patients should wear protective clothing, sunglasses, and sunscreen.  The patient was instructed to call the office immediately if the following severe adverse effects occur:  hearing changes, easy bruising/bleeding, severe headache, or vision changes.  The patient verbalized understanding of the proper use and possible adverse effects of sarecycline.  All of the patient's questions and concerns were addressed.
Topical Sulfur Applications Pregnancy And Lactation Text: This medication is Pregnancy Category C and has an unknown safety profile during pregnancy. It is unknown if this topical medication is excreted in breast milk.
Winlevi Pregnancy And Lactation Text: This medication is considered safe during pregnancy and breastfeeding.
Azelaic Acid Pregnancy And Lactation Text: This medication is considered safe during pregnancy and breast feeding.
Erythromycin Counseling:  I discussed with the patient the risks of erythromycin including but not limited to GI upset, allergic reaction, drug rash, diarrhea, increase in liver enzymes, and yeast infections.
Doxycycline Pregnancy And Lactation Text: This medication is Pregnancy Category D and not consider safe during pregnancy. It is also excreted in breast milk but is considered safe for shorter treatment courses.
Bactrim Counseling:  I discussed with the patient the risks of sulfa antibiotics including but not limited to GI upset, allergic reaction, drug rash, diarrhea, dizziness, photosensitivity, and yeast infections.  Rarely, more serious reactions can occur including but not limited to aplastic anemia, agranulocytosis, methemoglobinemia, blood dyscrasias, liver or kidney failure, lung infiltrates or desquamative/blistering drug rashes.
Dapsone Pregnancy And Lactation Text: This medication is Pregnancy Category C and is not considered safe during pregnancy or breast feeding.
Benzoyl Peroxide Counseling: Patient counseled that medicine may cause skin irritation and bleach clothing.  In the event of skin irritation, the patient was advised to reduce the amount of the drug applied or use it less frequently.   The patient verbalized understanding of the proper use and possible adverse effects of benzoyl peroxide.  All of the patient's questions and concerns were addressed.
Isotretinoin Pregnancy And Lactation Text: This medication is Pregnancy Category X and is considered extremely dangerous during pregnancy. It is unknown if it is excreted in breast milk.
Benzoyl Peroxide Pregnancy And Lactation Text: This medication is Pregnancy Category C. It is unknown if benzoyl peroxide is excreted in breast milk.
Topical Retinoid Pregnancy And Lactation Text: This medication is Pregnancy Category C. It is unknown if this medication is excreted in breast milk.
Topical Clindamycin Pregnancy And Lactation Text: This medication is Pregnancy Category B and is considered safe during pregnancy. It is unknown if it is excreted in breast milk.
Topical Retinoid counseling:  Patient advised to apply a pea-sized amount only at bedtime and wait 30 minutes after washing their face before applying.  If too drying, patient may add a non-comedogenic moisturizer. The patient verbalized understanding of the proper use and possible adverse effects of retinoids.  All of the patient's questions and concerns were addressed.
Tazorac Pregnancy And Lactation Text: This medication is not safe during pregnancy. It is unknown if this medication is excreted in breast milk.
Birth Control Pills Counseling: Birth Control Pill Counseling: I discussed with the patient the potential side effects of OCPs including but not limited to increased risk of stroke, heart attack, thrombophlebitis, deep venous thrombosis, hepatic adenomas, breast changes, GI upset, headaches, and depression.  The patient verbalized understanding of the proper use and possible adverse effects of OCPs. All of the patient's questions and concerns were addressed.
Topical Clindamycin Counseling: Patient counseled that this medication may cause skin irritation or allergic reactions.  In the event of skin irritation, the patient was advised to reduce the amount of the drug applied or use it less frequently.   The patient verbalized understanding of the proper use and possible adverse effects of clindamycin.  All of the patient's questions and concerns were addressed.
Detail Level: Zone
Erythromycin Pregnancy And Lactation Text: This medication is Pregnancy Category B and is considered safe during pregnancy. It is also excreted in breast milk.
Winlevi Counseling:  I discussed with the patient the risks of topical clascoterone including but not limited to erythema, scaling, itching, and stinging. Patient voiced their understanding.

## 2022-04-11 NOTE — PROCEDURE: MEDICATION COUNSELING
----- Message from Timmy Lowry MD sent at 6/10/2020  3:39 PM CDT -----  Patient has slipped to 6.8 on his hemoglobin but his hematocrit is actually gone up from 24.4 up to 24.9.  Therefore I do not think there has been a significant change in his counts but we should recheck it again in 1 week rather than to   Metronidazole Pregnancy And Lactation Text: This medication is Pregnancy Category B and considered safe during pregnancy.  It is also excreted in breast milk.

## 2022-04-11 NOTE — PROCEDURE: MEDICATION COUNSELING
Ambulatory (with cane) to triage with report of  Chief Complaint   Patient presents with   • Foot Pain     left, to ball of foot.  onset 1 week ago.  denies fever, denies recent trauma or injury.  reports recent infection from puncture wound to same foot approx 2 weeks ago      Cyclophosphamide Counseling:  I discussed with the patient the risks of cyclophosphamide including but not limited to hair loss, hormonal abnormalities, decreased fertility, abdominal pain, diarrhea, nausea and vomiting, bone marrow suppression and infection. The patient understands that monitoring is required while taking this medication.

## 2022-05-02 ENCOUNTER — APPOINTMENT (OUTPATIENT)
Dept: URBAN - METROPOLITAN AREA CLINIC 252 | Age: 27
Setting detail: DERMATOLOGY
End: 2022-05-02

## 2022-05-15 NOTE — PROGRESS NOTES
Tiffany is a 26 year old  female who presents for annual exam.     Besides routine health maintenance,  she would like to discuss painful intercourse, sharp pains in left side and check left breast.  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  She states that over the past year she feels that it has increased in size and is more painful.  She called to schedule a follow up appt at the breast center but was unable to schedule because she needs a new order.  She also states that she is having intermittent LLQ sharp pain during intercourse.  The pain occurs during penetration, occurs in some sexual positions, resolves on own after position change or after intercourse.  She denies any bleeding with/after IC.   Tiffany also states that she had a cousin that was recently dx with thyroid cancer and would like screening labs ordered.   She is recently engaged and does not desire contraception.  Menses are regular q 28-30 days and normal lasting 5 days.   Menses flow: normal.    Patient's last menstrual period was 2022 (exact date)..   Using none for contraception; using tracking stalin.    She is not currently considering pregnancy, but is accepting to possibility of pregnancy.    REPRODUCTIVE/GYNECOLOGIC HISTORY:  Tiffany is sexually active with male partner(s) and is currently in monogamous relationship.   STI testing offered?  Declined  History of abnormal Pap smear:  No  SOCIAL HISTORY  Abuse: does not report having previously been physical or sexually abused.    Do you feel safe in your environment? YES       She reports that she currently uses nicotine vaping device a few times per week, is attempting to cut down/quit.     Last PHQ-9 score on record =   PHQ-9 SCORE 2022   PHQ-9 Total Score -   PHQ-9 Total Score MyChart -   PHQ-9 Total Score 0     Last GAD7 score on record =   NGA-7 SCORE 2022   Total Score -   Total Score  4     Alcohol Score = 6    HEALTH MAINTENANCE:  Cholesterol: (No results found for: CHOL History of abnormal lipids: No  Mammo:10/29/20  . History of abnormal Mammo: YES, fibronoma left breast.  Regular Self Breast Exams: Yes  TSH: (  TSH   Date Value Ref Range Status   2014 1.64 0.40 - 5.00 mU/L Final    )  Pap; (  Lab Results   Component Value Date    PAP NIL 2018    PAP NIL 2015    )  Immunizations up to date: yes  (Gardasil, Tdap, Flu)  Health maintenance updated:  yes    HEALTHY HABITS  Eating habits: eats regular meals and has adequate calcium intake  Calcium/Vitamin D intake: source:  dairy   Exercise: How often do you exercise? 3-5 times/week;Cardio  Have you had an eye exam in the last two years? YES     Do you routinely see the dentist once or twice yearly? YES         HISTORY:  OB History    Para Term  AB Living   0 0 0 0 0 0   SAB IAB Ectopic Multiple Live Births   0 0 0 0 0     Past Medical History:   Diagnosis Date     Fibroadenoma of breast, left 2020    biopsy done 2021     Menorrhagia      Past Surgical History:   Procedure Laterality Date     BREAST BIOPSY, FNA RT/LT Left 2021    microflag placed     ORTHOPEDIC SURGERY Right     elbow     Family History   Problem Relation Age of Onset     Skin Cancer Mother 55     Unknown/Adopted Father      No Known Problems Sister      No Known Problems Brother      Cerebrovascular Disease Maternal Grandmother 70     Breast Cancer Maternal Grandmother 40     No Known Problems Maternal Grandfather      Heart Failure Paternal Grandfather      Irregular heart beat Paternal Grandfather      No Known Problems Daughter      No Known Problems Son      No Known Problems Maternal Half-Brother      No Known Problems Maternal Half-Sister      No Known Problems Paternal Half-Brother      No Known Problems Paternal Half-Sister      No Known Problems Niece      No Known Problems Nephew      Thyroid Cancer Cousin 30     No Known Problems  "Other      Cancer No family hx of      Heart Disease No family hx of      Diabetes No family hx of      Hypertension No family hx of      Hyperlipidemia No family hx of      Colon Cancer No family hx of      Prostate Cancer No family hx of      Depression No family hx of      Anxiety Disorder No family hx of      Substance Abuse No family hx of      Anesthesia Reaction No family hx of      Asthma No family hx of      Osteoporosis No family hx of      Genetic Disorder No family hx of      Social History     Socioeconomic History     Marital status: Single   Tobacco Use     Smoking status: Never Smoker     Smokeless tobacco: Never Used   Substance and Sexual Activity     Alcohol use: No     Alcohol/week: 0.0 standard drinks     Drug use: No     Sexual activity: Yes     Partners: Male     Birth control/protection: Pill   Other Topics Concern     Parent/sibling w/ CABG, MI or angioplasty before 65F 55M? No       Current Outpatient Medications:      clindamycin (CLEOCIN T) 1 % external solution, Apply thin layer to affected areas once daily for acne on face/neck, Disp: , Rfl:      clindamycin (CLEOCIN T) 1 % SWAB, Apply to buttocks once daily for acne., Disp: , Rfl:      albuterol (PROAIR HFA/PROVENTIL HFA/VENTOLIN HFA) 108 (90 Base) MCG/ACT inhaler, Inhale 2 puffs into the lungs every 6 hours (Patient not taking: Reported on 5/17/2022), Disp: 18 g, Rfl: 0     tretinoin (RETIN-A) 0.025 % external cream, Apply thin layer to face every night for Acne. Follow with a moisturizer. Start every other night until able to tolerate EVERY NIGHT, Disp: , Rfl:      Allergies   Allergen Reactions     Dust Mites      Molds & Smuts Unknown       Past medical, surgical, social and family history were reviewed and updated in EPIC.    ROS:   12 point review of systems negative other than symptoms noted below or in the HPI.  Breast: Lumps  Genitourinary: Painful [place]    PHYSICAL EXAM:  /70   Ht 1.765 m (5' 9.5\")   Wt 63.2 kg " (139 lb 6.4 oz)   LMP 04/29/2022 (Exact Date)   Breastfeeding No   BMI 20.29 kg/m     BMI: Body mass index is 20.29 kg/m .  Constitutional: healthy, alert and no distress  Neck: symmetrical, thyroid normal size, no masses present, no lymphadenopathy present.   Cardiovascular: RRR, no murmurs present  Respiratory: breathing unlabored, lungs CTA bilaterally  Breast:normal without masses, tenderness or nipple discharge, mass left breast approx. 3cm below nipple and tenderness left at lump site  Gastrointestinal: abdomen soft, non-tender, bowel sounds present  PELVIC EXAM:  Vulva: No lesions, no adenopathy, BUS WNL  Vagina: Moist, pink, discharge normal  well rugated, no lesions  Cervix:smooth, pink, no visible lesions  Uterus: Normal size, non-tender, mobile  Ovaries: No masses palpated  Rectal exam: deferred    ASSESSMENT/PLAN:    ICD-10-CM    1. Encounter for gynecological examination without abnormal finding  Z01.419 Pap thin layer screen reflex to HPV if ASCUS - recommended age 25 - 29 years     Quit Partner (Tobacco Cessation) Referral     Lipid panel reflex to direct LDL Fasting     TSH with free T4 reflex   2. Screening for cervical cancer  Z12.4 Pap thin layer screen reflex to HPV if ASCUS - recommended age 25 - 29 years   3. History of nicotine vaping  Z87.891 Quit Partner (Tobacco Cessation) Referral   4. Breast lump  N63.0 Breast Provider Referral     US Breast Left Complete 4 Quadrants     MA Diagnostic Digital Bilateral   5. Pelvic pain in female  R10.2 US Transvaginal Pelvic Non-OB     No results found for any visits on 05/17/22.      COUNSELING:   Reviewed preventive health counseling, as reflected in patient instructions  Special attention given to:        pap per ASCCP guidelines       Regular exercise       Family planning       screening labs ordered    Tobacco Cessation Action Plan: Phone counseling: Place order for Tobacco Cessation Referral   Breast center referral, US, mammogram order  placed  Continue to monitor dyspareunia/LLQ pain.  Discussed relief measures and danger signs.  If pain increases/worsens/continues or if she becomes more concerned, will consider pelvic US.    return to clinic 1 year/PRN      Mickie SEVILLA CNM, [facility]  [phone]    In addition to the preventive visit, 15  minutes of the appointment were spent evaluating and developing a treatment plan for her additional concern(s).    Est 1    5  Est 2    10  Est 3    15  Est 4    25  Est 5    40

## 2022-05-17 ENCOUNTER — OFFICE VISIT (OUTPATIENT)
Dept: MIDWIFE SERVICES | Facility: CLINIC | Age: 27
End: 2022-05-17
Payer: COMMERCIAL

## 2022-05-17 VITALS
SYSTOLIC BLOOD PRESSURE: 118 MMHG | DIASTOLIC BLOOD PRESSURE: 70 MMHG | WEIGHT: 139.4 LBS | HEIGHT: 70 IN | BODY MASS INDEX: 19.96 KG/M2

## 2022-05-17 DIAGNOSIS — Z12.4 SCREENING FOR CERVICAL CANCER: ICD-10-CM

## 2022-05-17 DIAGNOSIS — N63.0 BREAST LUMP: ICD-10-CM

## 2022-05-17 DIAGNOSIS — Z87.891 HISTORY OF NICOTINE VAPING: ICD-10-CM

## 2022-05-17 DIAGNOSIS — R10.2 PELVIC PAIN IN FEMALE: ICD-10-CM

## 2022-05-17 DIAGNOSIS — Z01.419 ENCOUNTER FOR GYNECOLOGICAL EXAMINATION WITHOUT ABNORMAL FINDING: Primary | ICD-10-CM

## 2022-05-17 PROCEDURE — 99213 OFFICE O/P EST LOW 20 MIN: CPT | Mod: 25 | Performed by: NURSE PRACTITIONER

## 2022-05-17 PROCEDURE — 99395 PREV VISIT EST AGE 18-39: CPT | Performed by: NURSE PRACTITIONER

## 2022-05-17 PROCEDURE — G0145 SCR C/V CYTO,THINLAYER,RESCR: HCPCS | Performed by: NURSE PRACTITIONER

## 2022-05-17 RX ORDER — TRETINOIN 0.25 MG/G
CREAM TOPICAL
COMMUNITY
Start: 2022-04-13 | End: 2023-10-18

## 2022-05-17 RX ORDER — CLINDAMYCIN PHOSPHATE 11.9 MG/ML
SOLUTION TOPICAL
COMMUNITY
Start: 2022-04-13 | End: 2023-07-05

## 2022-05-17 RX ORDER — CLINDAMYCIN PHOSPHATE 10 MG/ML
SOLUTION TOPICAL
COMMUNITY
Start: 2022-04-13 | End: 2022-09-01

## 2022-05-17 ASSESSMENT — ANXIETY QUESTIONNAIRES
5. BEING SO RESTLESS THAT IT IS HARD TO SIT STILL: NOT AT ALL
1. FEELING NERVOUS, ANXIOUS, OR ON EDGE: MORE THAN HALF THE DAYS
7. FEELING AFRAID AS IF SOMETHING AWFUL MIGHT HAPPEN: NOT AT ALL
3. WORRYING TOO MUCH ABOUT DIFFERENT THINGS: SEVERAL DAYS
2. NOT BEING ABLE TO STOP OR CONTROL WORRYING: NOT AT ALL
GAD7 TOTAL SCORE: 4
IF YOU CHECKED OFF ANY PROBLEMS ON THIS QUESTIONNAIRE, HOW DIFFICULT HAVE THESE PROBLEMS MADE IT FOR YOU TO DO YOUR WORK, TAKE CARE OF THINGS AT HOME, OR GET ALONG WITH OTHER PEOPLE: SOMEWHAT DIFFICULT
6. BECOMING EASILY ANNOYED OR IRRITABLE: SEVERAL DAYS

## 2022-05-17 ASSESSMENT — PATIENT HEALTH QUESTIONNAIRE - PHQ9
SUM OF ALL RESPONSES TO PHQ QUESTIONS 1-9: 0
5. POOR APPETITE OR OVEREATING: NOT AT ALL

## 2022-05-17 NOTE — PATIENT INSTRUCTIONS
Preventive Health Recommendations  Female Ages 21 - 39  Yearly exam:   See your health care provider every year in order to  1. Review health changes.  2. Discuss preventive care.    3. Review your medicines if you your provider has prescribed any.    You do not need a Pap test if your uterus was removed (hysterectomy) and you have not had cancer.  You should be tested each year for STIs (sexually transmitted diseases) if you're at risk.   Talk to your provider about how often to have your cholesterol checked, about every 5 years if normal.  If you are at risk for diabetes, you should have a diabetes test (fasting glucose).  Vitamin D deficiency screening and thyroid disease screening is also recommended every 3-5 years depending on risk factors or more often if symptomatic  PAP Smear:   Until age 30: Get a Pap test every three years (more often if you have had an abnormal result)    After age 30: Talk to your provider about whether you should have a Pap test every 3 years or have a Pap test with HPV screening every 5 years.   Shots: Get a flu shot each year. Get a tetanus shot every 10 years.   Nutrition:   Eat at least 5 servings of fruits and vegetables each day  Eat REAL food, stay away from processed foods.  Eat whole-grain bread, whole-wheat pasta and brown rice instead of white grains and rice.  For bone health:  Eat calcium-rich foods or take calcium pills (500 to 600 mg) twice a day with food (1200 mg per day). Also take vitamin D (2000 IUs) each day.     Lifestyle  Exercise regularly (30 minutes a day, 5 days of the week). This will help you control your weight and prevent disease.  Weight bearing exercise such as weight lifting, walking, running and yoga will be beneficial later in life preventing osteoporosis   Limit alcohol to one drink per day.  No smoking.   Wear sunscreen to prevent skin cancer.  See your dentist every six months to one year for an exam and cleaning depending on their  recommendation  Get an eye exam every two years or more often if you wear glasses or contacts.

## 2022-05-18 PROBLEM — D24.2 FIBROADENOMA OF BREAST, LEFT: Status: ACTIVE | Noted: 2020-12-01

## 2022-05-18 ASSESSMENT — ANXIETY QUESTIONNAIRES: GAD7 TOTAL SCORE: 4

## 2022-05-20 ENCOUNTER — HOSPITAL ENCOUNTER (OUTPATIENT)
Dept: MAMMOGRAPHY | Facility: CLINIC | Age: 27
Discharge: HOME | End: 2022-05-20
Attending: NURSE PRACTITIONER
Payer: COMMERCIAL

## 2022-05-20 DIAGNOSIS — N63.0 BREAST LUMP: ICD-10-CM

## 2022-05-20 LAB
BKR LAB AP GYN ADEQUACY: NORMAL
BKR LAB AP GYN INTERPRETATION: NORMAL
BKR LAB AP HPV REFLEX: NORMAL
BKR LAB AP PREVIOUS ABNORMAL: NORMAL
PATH REPORT.COMMENTS IMP SPEC: NORMAL
PATH REPORT.COMMENTS IMP SPEC: NORMAL
PATH REPORT.RELEVANT HX SPEC: NORMAL

## 2022-05-20 PROCEDURE — 76642 ULTRASOUND BREAST LIMITED: CPT | Mod: 50

## 2022-05-23 ENCOUNTER — PATIENT OUTREACH (OUTPATIENT)
Dept: ONCOLOGY | Facility: CLINIC | Age: 27
End: 2022-05-23
Payer: COMMERCIAL

## 2022-05-23 NOTE — PROGRESS NOTES
New Patient Oncology Nurse Navigator Note     Referring provider: Mickie Coker APRN CNM      Referring Clinic/Organization: [facility]    Referred to (specialty:) Cancer Surgery      Date Referral Received: May 17, 2022     Evaluation for:  Benign breast condition     Clinical History (per Nurse review of records provided):      Patient had a targeted left breast ultrasound on 5/20/22 after an interval increase in size of a known left breast fibroadenoma was identified. That showed the previously biopsied (5/14/21) benign fibroadenoma at the 4:00 position, 3 cm from the nipple, measuring 2.8 x 1.8 x 2.8 cm (previously 2.5 x 1.5 x 2.3 cm). Targeted right breast ultrasound shows a small benign intramammary lymph node at the 10:30 position, 10 cm from the nipple, measuring 0.6 x 0.4 cm. Otherwise there is only normal glandular tissue in the area of right breast concern.  Surgical consultation was recommended.     Records Location: See Bookmarked material     Writer received referral, reviewed for appropriate plan, and transferred order to  [facility] [place] [facility]  for completion. Dalila Smalls RN

## 2022-05-25 ENCOUNTER — OFFICE VISIT (OUTPATIENT)
Dept: SURGERY | Facility: CLINIC | Age: 27
End: 2022-05-25
Payer: COMMERCIAL

## 2022-05-25 VITALS — HEART RATE: 63 BPM | DIASTOLIC BLOOD PRESSURE: 80 MMHG | SYSTOLIC BLOOD PRESSURE: 120 MMHG

## 2022-05-25 DIAGNOSIS — D24.2 FIBROADENOMA OF BREAST, LEFT: Primary | ICD-10-CM

## 2022-05-25 DIAGNOSIS — N62 MACROMASTIA: ICD-10-CM

## 2022-05-25 PROCEDURE — 99204 OFFICE O/P NEW MOD 45 MIN: CPT | Performed by: SURGERY

## 2022-05-25 NOTE — NURSING NOTE
Breast Patients    BREAST PATIENTS (ALL)    1-Do you have any of the following symptoms? Breast Pain and Lump(s) or Mass(es)  2-In which breast are you having the symptoms? both  3-Have you had a Mammogram? No  4-Have you ever had a breast cyst drained? No  5-Have you ever had a breast biopsy? Yes:  Left   -   Date:  5/14/21  6-Have you ever had a Breast Cancer? No   7-Is there a history of Breast Cancer in your family? Yes   Relationship to you:    ?  8-Have you ever had Ovarian Cancer? No  9-Is there a history of Ovarian Cancer in your family? No  10-Summarize your caffeine intake (i.e. coffee, tea, chocolate, soda etc.): Coffee 1 cup per day     BREAST PATIENTS (FEMALE)    11-What age did your periods begin? 12  12-Date your last menstrual period began? 4/29/22  13-Number of full-term pregnancies: 0  14-Your age when your first child was born? N/A  15-Did you nurse your children? N/A  16-Are you pregnant now? No  17-Have you begun menopause? No  18-Have you had either ovary removed?No  19-Do you have breast implants? No   20-Do you use hormone replacement therapy?  No  21-Have you taken oral contraceptive pills?  Yes, For how many years?  3-4 years   22-Have you had an intrauterine device (IUD) placed?  Yes, For how many years?  1-2 years   23-What is your current bra size?  34DDD      Brittany Aranda MA

## 2022-05-25 NOTE — PROGRESS NOTES
"[facility] Consultation    CHIEF COMPLAINT:  Chief Complaint   Patient presents with     Consult     Left breast fibroadenoma       HISTORY OF PRESENT ILLNESS:  Tiffany Munoz is a 26 year old female who is seen in consultation at the request of Dr. Coker for evaluation of left breast fibroadenoma.  The patient reports that she first noticed a palpable mass of the left breast in 2020.  Since she first noticed the mass, it has increased in size.  She does have discomfort associated with the mass.  No left breast nipple discharge.  The patient has noticed some prominent breast tissue in the upper outer quadrant of the right breast, which becomes achy around the time of her menstrual cycle.  The patient did previously undergo needle biopsy of her left breast mass in May 2021.  Pathology demonstrated fibroadenoma.  Repeat left breast ultrasound in May 2022 demonstrated the patient's left breast fibroadenoma to be increasing in size.  The patient's right breast was also evaluated with ultrasound, and no significant findings were noted.  Family history significant only for a maternal grandmother with breast cancer.  Patient began menarche at the age of 12.  She has not been pregnant.  She was on oral contraceptives for 3 to 4 years.  She has had an IUD in place for 1 to 2 years.  Current bra size is \"34DDD+\".    REVIEW OF SYSTEMS:  Constitutional: No fevers or chills  Eyes: No blurred or double vision  HENT: Reports headaches, No rhinorrhea, No sore throat  Respiratory: No cough or shortness of breath  Cardiovascular: Denies chest pain or palpitations  Gastrointestinal: No abdominal pain or nausea/vomiting  Genitourinary: No hematuria or dysuria  Musculoskeletal: Denies arthralgias or myalgias  Neurologic: No numbness or tingling  Integumentary: No skin rashes    Past Medical History:   Diagnosis Date     Fibroadenoma of breast, left 12/2020    biopsy done 5/2021     Menorrhagia        Past " Surgical History:   Procedure Laterality Date     BREAST BIOPSY, FNA RT/LT Left 05/2021    microflag placed     ORTHOPEDIC SURGERY Right     elbow       Family History   Problem Relation Age of Onset     Skin Cancer Mother 55     Unknown/Adopted Father      No Known Problems Sister      No Known Problems Brother      Cerebrovascular Disease Maternal Grandmother 70     Breast Cancer Maternal Grandmother 40     No Known Problems Maternal Grandfather      Heart Failure Paternal Grandfather      Irregular heart beat Paternal Grandfather      No Known Problems Daughter      No Known Problems Son      No Known Problems Maternal Half-Brother      No Known Problems Maternal Half-Sister      No Known Problems Paternal Half-Brother      No Known Problems Paternal Half-Sister      No Known Problems Niece      No Known Problems Nephew      Thyroid Cancer Cousin 30     No Known Problems Other      Cancer No family hx of      Heart Disease No family hx of      Diabetes No family hx of      Hypertension No family hx of      Hyperlipidemia No family hx of      Colon Cancer No family hx of      Prostate Cancer No family hx of      Depression No family hx of      Anxiety Disorder No family hx of      Substance Abuse No family hx of      Anesthesia Reaction No family hx of      Asthma No family hx of      Osteoporosis No family hx of      Genetic Disorder No family hx of        Social History     Tobacco Use     Smoking status: Never Smoker     Smokeless tobacco: Never Used   Substance Use Topics     Alcohol use: Yes     Alcohol/week: 6.0 standard drinks     Types: 3 Glasses of wine, 3 Cans of beer per week     Comment: approx 6 drinks/week       Patient Active Problem List   Diagnosis     Anxiety     Moderate episode of recurrent major depressive disorder (H) onset teens      Insomnia due to other mental disorder     Family history of malignant neoplasm of breast-pat & mat grdma      Breast pain-bilat-L>R since 13y/o      Large  breasts     Fibroadenoma of breast, left       Allergies   Allergen Reactions     Dust Mites      Molds & Smuts Unknown       Current Outpatient Medications   Medication Sig Dispense Refill     albuterol (PROAIR HFA/PROVENTIL HFA/VENTOLIN HFA) 108 (90 Base) MCG/ACT inhaler Inhale 2 puffs into the lungs every 6 hours 18 g 0     clindamycin (CLEOCIN T) 1 % external solution Apply thin layer to affected areas once daily for acne on face/neck       clindamycin (CLEOCIN T) 1 % SWAB Apply to buttocks once daily for acne.       tretinoin (RETIN-A) 0.025 % external cream Apply thin layer to face every night for Acne. Follow with a moisturizer. Start every other night until able to tolerate EVERY NIGHT         Vitals: /80   Pulse 63   LMP 04/29/2022 (Exact Date)   BMI= There is no height or weight on file to calculate BMI.    EXAM:  General: Vital signs reviewed, in no apparent distress  Eyes: Anicteric  HENT: Normocephalic, atraumatic, trachea midline   Respiratory: Breathing nonlabored  Cardiovascular: Regular rate and rhythm  Breast: Approximately 2 to 3 cm firm, smooth, mobile, superficial mass at the inferior lateral aspect of the left nipple areolar complex; prominent glandular breast tissue bilaterally without additional discretely palpable masses  Lymph: No palpable axillary lymphadenopathy bilaterally  Musculoskeletal: No gross deformities  Neurologic: Grossly nonfocal exam  Psychiatric: Normal mood, affect and insight  Integumentary: Warm and dry    All labs and imaging personally reviewed and significant for:   US BREAST BILATERAL LIMITED 1-3 QUADRANTS, 5/20/2022 3:43 PM     HISTORY: Interval increase in size of known left breast fibroadenoma.   Also new right breast lump.     COMPARISON:  Left breast ultrasound 5/11/2021      FINDINGS:  Targeted left breast ultrasound shows the previously  biopsied benign fibroadenoma at the 4:00 position, 3 cm from the  nipple, measuring 2.8 x 1.8 x 2.8 cm (previously  2.5 x 1.5 x 2.3 cm).  Targeted right breast ultrasound shows a small benign intramammary  lymph node at the 10:30 position, 10 cm from the nipple, measuring 0.6  x 0.4 cm. Otherwise there is only normal glandular tissue in the area  of right breast concern.                                                                       IMPRESSION: BI-RADS CATEGORY: 2 - Benign Finding(s).  Some interval enlargement of the benign left breast fibroadenoma. The  patient would like to consult with a surgeon regarding excision and we  will assist her in scheduling that appointment. No abnormality in the  contralateral right breast.    FINAL DIAGNOSIS:   Breast, left, 4:00, 3 cm from nipple: Ultrasound guided core biopsy:   - Fibroadenoma     ASSESSMENT:  Tiffany Munoz is a 26 year old who presents with left breast fibroadenoma and macromastia.  Significant pertinent co-morbidities include: None.       PLAN:  A thorough discussion was had today in clinic regarding the patient's enlarging left breast fibroadenoma.  Surgical excision through a left breast periareolar incision was discussed.  Risks and benefits of surgery were discussed.  The patient reports that she is interested in being evaluated for bilateral breast reduction surgery.  Ideally, she would like to consider having both procedures (breast reduction and fibroadenoma excision) done together.  I have placed a referral for plastic surgery.  I encouraged the patient to reach out after she has seen plastic surgery and further surgical planning can be completed at that time.    It was my pleasure to participate in the care of Tiffany Munoz in clinic today. Thank you for this consultation.         Heidi Keene MD    Please route or send letter to:  Primary Care Provider (PCP), Referring Provider and Dr.Jon Lipscomb

## 2022-05-25 NOTE — LETTER
"May 25, 2022          Mickie Asencio Sheela, APRN CNM  [address] [facility] [address]  [place] [address]      RE:   Tiffany Munoz 1995      Dear Colleague,    Thank you for referring your patient, Tiffany Munoz, to [facility]. Please see a copy of my visit note below.    Tiffany Munoz is a 26 year old female who is seen in consultation at the request of Dr. Coker for evaluation of left breast fibroadenoma.  The patient reports that she first noticed a palpable mass of the left breast in 2020.  Since she first noticed the mass, it has increased in size.  She does have discomfort associated with the mass.  No left breast nipple discharge.  The patient has noticed some prominent breast tissue in the upper outer quadrant of the right breast, which becomes achy around the time of her menstrual cycle.  The patient did previously undergo needle biopsy of her left breast mass in May 2021.  Pathology demonstrated fibroadenoma.  Repeat left breast ultrasound in May 2022 demonstrated the patient's left breast fibroadenoma to be increasing in size.  The patient's right breast was also evaluated with ultrasound, and no significant findings were noted.  Family history significant only for a maternal grandmother with breast cancer.  Patient began menarche at the age of 12.  She has not been pregnant.  She was on oral contraceptives for 3 to 4 years.  She has had an IUD in place for 1 to 2 years.  Current bra size is \"34DDD+\".     REVIEW OF SYSTEMS:  Constitutional: No fevers or chills  Eyes: No blurred or double vision  HENT: Reports headaches, No rhinorrhea, No sore throat  Respiratory: No cough or shortness of breath  Cardiovascular: Denies chest pain or palpitations  Gastrointestinal: No abdominal pain or nausea/vomiting  Genitourinary: No hematuria or dysuria  Musculoskeletal: Denies arthralgias or myalgias  Neurologic: No numbness or tingling  Integumentary: No skin " rashes     Past Medical History        Past Medical History:   Diagnosis Date     Fibroadenoma of breast, left 12/2020     biopsy done 5/2021     Menorrhagia              Past Surgical History         Past Surgical History:   Procedure Laterality Date     BREAST BIOPSY, FNA RT/LT Left 05/2021     microflag placed     ORTHOPEDIC SURGERY Right       elbow            Family History         Family History   Problem Relation Age of Onset     Skin Cancer Mother 55     Unknown/Adopted Father       No Known Problems Sister       No Known Problems Brother       Cerebrovascular Disease Maternal Grandmother 70     Breast Cancer Maternal Grandmother 40     No Known Problems Maternal Grandfather       Heart Failure Paternal Grandfather       Irregular heart beat Paternal Grandfather       No Known Problems Daughter       No Known Problems Son       No Known Problems Maternal Half-Brother       No Known Problems Maternal Half-Sister       No Known Problems Paternal Half-Brother       No Known Problems Paternal Half-Sister       No Known Problems Niece       No Known Problems Nephew       Thyroid Cancer Cousin 30     No Known Problems Other       Cancer No family hx of       Heart Disease No family hx of       Diabetes No family hx of       Hypertension No family hx of       Hyperlipidemia No family hx of       Colon Cancer No family hx of       Prostate Cancer No family hx of       Depression No family hx of       Anxiety Disorder No family hx of       Substance Abuse No family hx of       Anesthesia Reaction No family hx of       Asthma No family hx of       Osteoporosis No family hx of       Genetic Disorder No family hx of              Social History            Tobacco Use     Smoking status: Never Smoker     Smokeless tobacco: Never Used   Substance Use Topics     Alcohol use: Yes       Alcohol/week: 6.0 standard drinks       Types: 3 Glasses of wine, 3 Cans of beer per week       Comment: approx 6 drinks/week              Patient Active Problem List   Diagnosis     Anxiety     Moderate episode of recurrent major depressive disorder (H) onset teens      Insomnia due to other mental disorder     Family history of malignant neoplasm of breast-pat & mat grdma      Breast pain-bilat-L>R since 13y/o      Large breasts     Fibroadenoma of breast, left              Allergies   Allergen Reactions     Dust Mites       Molds & Smuts Unknown         Current Outpatient Prescriptions          Current Outpatient Medications   Medication Sig Dispense Refill     albuterol (PROAIR HFA/PROVENTIL HFA/VENTOLIN HFA) 108 (90 Base) MCG/ACT inhaler Inhale 2 puffs into the lungs every 6 hours 18 g 0     clindamycin (CLEOCIN T) 1 % external solution Apply thin layer to affected areas once daily for acne on face/neck         clindamycin (CLEOCIN T) 1 % SWAB Apply to buttocks once daily for acne.         tretinoin (RETIN-A) 0.025 % external cream Apply thin layer to face every night for Acne. Follow with a moisturizer. Start every other night until able to tolerate EVERY NIGHT                Vitals: /80   Pulse 63   LMP 04/29/2022 (Exact Date)   BMI= There is no height or weight on file to calculate BMI.     EXAM:  General: Vital signs reviewed, in no apparent distress  Eyes: Anicteric  HENT: Normocephalic, atraumatic, trachea midline   Respiratory: Breathing nonlabored  Cardiovascular: Regular rate and rhythm  Breast: Approximately 2 to 3 cm firm, smooth, mobile, superficial mass at the inferior lateral aspect of the left nipple areolar complex; prominent glandular breast tissue bilaterally without additional discretely palpable masses  Lymph: No palpable axillary lymphadenopathy bilaterally  Musculoskeletal: No gross deformities  Neurologic: Grossly nonfocal exam  Psychiatric: Normal mood, affect and insight  Integumentary: Warm and dry     All labs and imaging personally reviewed and significant for:   US BREAST BILATERAL LIMITED 1-3 QUADRANTS,  5/20/2022 3:43 PM     HISTORY: Interval increase in size of known left breast fibroadenoma.   Also new right breast lump.     COMPARISON:  Left breast ultrasound 5/11/2021      FINDINGS:  Targeted left breast ultrasound shows the previously  biopsied benign fibroadenoma at the 4:00 position, 3 cm from the  nipple, measuring 2.8 x 1.8 x 2.8 cm (previously 2.5 x 1.5 x 2.3 cm).  Targeted right breast ultrasound shows a small benign intramammary  lymph node at the 10:30 position, 10 cm from the nipple, measuring 0.6  x 0.4 cm. Otherwise there is only normal glandular tissue in the area  of right breast concern.                                                                       IMPRESSION: BI-RADS CATEGORY: 2 - Benign Finding(s).  Some interval enlargement of the benign left breast fibroadenoma. The  patient would like to consult with a surgeon regarding excision and we  will assist her in scheduling that appointment. No abnormality in the  contralateral right breast.     FINAL DIAGNOSIS:   Breast, left, 4:00, 3 cm from nipple: Ultrasound guided core biopsy:   - Fibroadenoma      ASSESSMENT:  Tiffany Munoz is a 26 year old who presents with left breast fibroadenoma and macromastia.  Significant pertinent co-morbidities include: None.         PLAN:  A thorough discussion was had today in clinic regarding the patient's enlarging left breast fibroadenoma.  Surgical excision through a left breast periareolar incision was discussed.  Risks and benefits of surgery were discussed.  The patient reports that she is interested in being evaluated for bilateral breast reduction surgery.  Ideally, she would like to consider having both procedures (breast reduction and fibroadenoma excision) done together.  I have placed a referral for plastic surgery.  I encouraged the patient to reach out after she has seen plastic surgery and further surgical planning can be completed at that time.        Again, thank you for allowing me to  participate in the care of your patient.      Sincerely,      Heidi Keene MD

## 2022-05-27 ENCOUNTER — TELEPHONE (OUTPATIENT)
Dept: SURGERY | Facility: CLINIC | Age: 27
End: 2022-05-27
Payer: COMMERCIAL

## 2022-06-08 ENCOUNTER — E-VISIT (OUTPATIENT)
Dept: MIDWIFE SERVICES | Facility: CLINIC | Age: 27
End: 2022-06-08
Payer: COMMERCIAL

## 2022-06-08 DIAGNOSIS — N39.0 ACUTE UTI (URINARY TRACT INFECTION): Primary | ICD-10-CM

## 2022-06-08 PROCEDURE — 99421 OL DIG E/M SVC 5-10 MIN: CPT | Performed by: NURSE PRACTITIONER

## 2022-06-08 RX ORDER — NITROFURANTOIN 25; 75 MG/1; MG/1
100 CAPSULE ORAL 2 TIMES DAILY
Qty: 10 CAPSULE | Refills: 0 | Status: SHIPPED | OUTPATIENT
Start: 2022-06-08 | End: 2022-06-13

## 2022-06-08 NOTE — PATIENT INSTRUCTIONS
Dear Tiffany Munoz    After reviewing your responses, I've been able to diagnose you with a urinary tract infection, which is a common infection of the bladder with bacteria.  This is not a sexually transmitted infection, though urinating immediately after intercourse can help prevent infections.  Drinking lots of fluids is also helpful to clear your current infection and prevent the next one.      I have sent a prescription for antibiotics to your pharmacy to treat this infection.    It is important that you take all of your prescribed medication even if your symptoms are improving after a few doses.  Taking all of your medicine helps prevent the symptoms from returning.     If your symptoms worsen, you develop pain in your back or stomach, develop fevers, or are not improving in 5 days, please contact your primary care provider for an appointment or visit any of our convenient Walk-in or Urgent Care Centers to be seen, which can be found on our website here.    Thanks again for choosing us as your health care partner,    ROEL Elizabeth CNM    Urinary Tract Infections in Women  Urinary tract infections (UTIs) are most often caused by bacteria. These bacteria enter the urinary tract. The bacteria may come from inside the body. Or they may travel from the skin outside the rectum or vagina into the urethra. Female anatomy makes it easy for bacteria from the bowel to enter a woman s urinary tract, which is the most common source of UTI. This means women develop UTIs more often than men. Pain in or around the urinary tract is a common UTI symptom. But the only way to know for sure if you have a UTI for the healthcare provider to test your urine. The two tests that may be done are the urinalysis and urine culture.     Types of UTIs    Cystitis. A bladder infection (cystitis) is the most common UTI in women. You may have urgent or frequent need to pee. You may also have pain, burning when you pee, and  bloody urine.    Urethritis. This is an inflamed urethra, which is the tube that carries urine from the bladder to outside the body. You may have lower stomach or back pain. You may also have urgent or frequent need to pee.    Pyelonephritis. This is a kidney infection. If not treated, it can be serious and damage your kidneys. In severe cases, you may need to stay in the hospital. You may have a fever and lower back pain.    Medicines to treat a UTI  Most UTIs are treated with antibiotics. These kill the bacteria. The length of time you need to take them depends on the type of infection. It may be as short as 3 days. If you have repeated UTIs, you may need a low-dose antibiotic for several months. Take antibiotics exactly as directed. Don t stop taking them until all of the medicine is gone. If you stop taking the antibiotic too soon, the infection may not go away. You may also develop a resistance to the antibiotic. This can make it much harder to treat.   Lifestyle changes to treat and prevent UTIs   The lifestyle changes below will help get rid of your UTI. They may also help prevent future UTIs.     Drink plenty of fluids. This includes water, juice, or other caffeine-free drinks. Fluids help flush bacteria out of your body.    Empty your bladder. Always empty your bladder when you feel the urge to pee. And always pee before going to sleep. Urine that stays in your bladder can lead to infection. Try to pee before and after sex as well.    Practice good personal hygiene. Wipe yourself from front to back after using the toilet. This helps keep bacteria from getting into the urethra.    Use condoms during sex. These help prevent UTIs caused by sexually transmitted bacteria. Also don't use spermicides during sex. These can increase the risk for UTIs. Choose other forms of birth control instead. For women who tend to get UTIs after sex, a low-dose of a preventive antibiotic may be used. Be sure to discuss this  option with your healthcare provider.    Follow up with your healthcare provider as directed. He or she may test to make sure the infection has cleared. If needed, more treatment may be started.  [employer] last reviewed this educational content on 7/1/2019 2000-2021 [employer] All rights reserved. This information is not intended as a substitute for professional medical care. Always follow your healthcare professional's instructions.          Understanding Urinary Tract Infections (UTIs)   Most UTIs are caused by bacteria, but they may also be caused by viruses or fungi. Bacteria from the bowel are the most common source of infection. The infection may start because of any of the following:     Sexual activity.  During sex, bacteria can travel from the penis, vagina, or rectum into the urethra.     Bacteria outside the rectum getting into the urethra.  Bacteria on the skin outside the rectum may travel into the urethra. This is more common in women since the rectum and urethra are closer to each other than in men. Wiping from front to back after using the toilet and keeping the area clean can help prevent germs from getting to the urethra.    Blocked urine flow through the urinary tract. If urine sits too long, germs may start to grow out of control.  Parts of the urinary tract  The infection can occur in any part of the urinary tract.       The kidneys. These organs collect and store urine.    The ureters. These tubes carry urine from the kidneys to the bladder.    The bladder. This holds urine until you are ready to let it out.    The urethra. This tube carries urine from the bladder out of the body. It is shorter in women, so bacteria can move through it more easily. The urethra is longer in men, so a UTI is less likely to reach the bladder or kidneys in men.  [employer] last reviewed this educational content on 1/1/2020 2000-2021 [employer] All rights reserved. This information  is not intended as a substitute for professional medical care. Always follow your healthcare professional's instructions.

## 2022-07-07 ENCOUNTER — APPOINTMENT (OUTPATIENT)
Dept: URBAN - METROPOLITAN AREA CLINIC 256 | Age: 27
Setting detail: DERMATOLOGY
End: 2022-07-11

## 2022-07-07 VITALS — HEIGHT: 70 IN | WEIGHT: 139 LBS

## 2022-07-07 DIAGNOSIS — L72.8 OTHER FOLLICULAR CYSTS OF THE SKIN AND SUBCUTANEOUS TISSUE: ICD-10-CM

## 2022-07-07 DIAGNOSIS — L70.0 ACNE VULGARIS: ICD-10-CM

## 2022-07-07 PROCEDURE — OTHER INTRALESIONAL KENALOG: OTHER

## 2022-07-07 PROCEDURE — OTHER PRESCRIPTION MEDICATION MANAGEMENT: OTHER

## 2022-07-07 PROCEDURE — 99213 OFFICE O/P EST LOW 20 MIN: CPT | Mod: 25

## 2022-07-07 PROCEDURE — OTHER COUNSELING: OTHER

## 2022-07-07 PROCEDURE — 11900 INJECT SKIN LESIONS </W 7: CPT

## 2022-07-07 PROCEDURE — OTHER ADDITIONAL NOTES: OTHER

## 2022-07-07 ASSESSMENT — LOCATION ZONE DERM
LOCATION ZONE: FACE
LOCATION ZONE: TRUNK

## 2022-07-07 ASSESSMENT — LOCATION DETAILED DESCRIPTION DERM
LOCATION DETAILED: LEFT BUTTOCK
LOCATION DETAILED: LEFT MEDIAL FOREHEAD

## 2022-07-07 ASSESSMENT — LOCATION SIMPLE DESCRIPTION DERM
LOCATION SIMPLE: LEFT BUTTOCK
LOCATION SIMPLE: LEFT FOREHEAD

## 2022-07-07 NOTE — PROCEDURE: PRESCRIPTION MEDICATION MANAGEMENT
Detail Level: Zone
Initiate Treatment: Tretinoin topical 0.025% cream; apply pea sized amount to face nightly.
Continue Regimen: Clindamycin topical swab and topical solution; apply daily to buttock and face area as needed for acne.
Render In Strict Bullet Format?: No

## 2022-07-07 NOTE — PROCEDURE: ADDITIONAL NOTES
Detail Level: Simple
Additional Notes: \\n-patient is planning pregnancy in the next year, so oral medication is not an option at this time.\\n-consider Winlevi if hormonal acne.
Render Risk Assessment In Note?: no

## 2022-07-07 NOTE — PROCEDURE: COUNSELING
Doxycycline Counseling:  Patient counseled regarding possible photosensitivity and increased risk for sunburn.  Patient instructed to avoid sunlight, if possible.  When exposed to sunlight, patients should wear protective clothing, sunglasses, and sunscreen.  The patient was instructed to call the office immediately if the following severe adverse effects occur:  hearing changes, easy bruising/bleeding, severe headache, or vision changes.  The patient verbalized understanding of the proper use and possible adverse effects of doxycycline.  All of the patient's questions and concerns were addressed.
Dapsone Counseling: I discussed with the patient the risks of dapsone including but not limited to hemolytic anemia, agranulocytosis, rashes, methemoglobinemia, kidney failure, peripheral neuropathy, headaches, GI upset, and liver toxicity.  Patients who start dapsone require monitoring including baseline LFTs and weekly CBCs for the first month, then every month thereafter.  The patient verbalized understanding of the proper use and possible adverse effects of dapsone.  All of the patient's questions and concerns were addressed.
Bactrim Counseling:  I discussed with the patient the risks of sulfa antibiotics including but not limited to GI upset, allergic reaction, drug rash, diarrhea, dizziness, photosensitivity, and yeast infections.  Rarely, more serious reactions can occur including but not limited to aplastic anemia, agranulocytosis, methemoglobinemia, blood dyscrasias, liver or kidney failure, lung infiltrates or desquamative/blistering drug rashes.
Sarecycline Pregnancy And Lactation Text: This medication is Pregnancy Category D and not consider safe during pregnancy. It is also excreted in breast milk.
High Dose Vitamin A Pregnancy And Lactation Text: High dose vitamin A therapy is contraindicated during pregnancy and breast feeding.
Azithromycin Counseling:  I discussed with the patient the risks of azithromycin including but not limited to GI upset, allergic reaction, drug rash, diarrhea, and yeast infections.
Dapsone Pregnancy And Lactation Text: This medication is Pregnancy Category C and is not considered safe during pregnancy or breast feeding.
Use Enhanced Medication Counseling?: No
Tetracycline Counseling: Patient counseled regarding possible photosensitivity and increased risk for sunburn.  Patient instructed to avoid sunlight, if possible.  When exposed to sunlight, patients should wear protective clothing, sunglasses, and sunscreen.  The patient was instructed to call the office immediately if the following severe adverse effects occur:  hearing changes, easy bruising/bleeding, severe headache, or vision changes.  The patient verbalized understanding of the proper use and possible adverse effects of tetracycline.  All of the patient's questions and concerns were addressed. Patient understands to avoid pregnancy while on therapy due to potential birth defects.
Aklief Pregnancy And Lactation Text: It is unknown if this medication is safe to use during pregnancy.  It is unknown if this medication is excreted in breast milk.  Breastfeeding women should use the topical cream on the smallest area of the skin for the shortest time needed while breastfeeding.  Do not apply to nipple and areola.
Topical Sulfur Applications Pregnancy And Lactation Text: This medication is Pregnancy Category C and has an unknown safety profile during pregnancy. It is unknown if this topical medication is excreted in breast milk.
Benzoyl Peroxide Pregnancy And Lactation Text: This medication is Pregnancy Category C. It is unknown if benzoyl peroxide is excreted in breast milk.
Erythromycin Counseling:  I discussed with the patient the risks of erythromycin including but not limited to GI upset, allergic reaction, drug rash, diarrhea, increase in liver enzymes, and yeast infections.
Detail Level: Zone
Spironolactone Counseling: Patient advised regarding risks of diarrhea, abdominal pain, hyperkalemia, birth defects (for female patients), liver toxicity and renal toxicity. The patient may need blood work to monitor liver and kidney function and potassium levels while on therapy. The patient verbalized understanding of the proper use and possible adverse effects of spironolactone.  All of the patient's questions and concerns were addressed.
Topical Clindamycin Counseling: Patient counseled that this medication may cause skin irritation or allergic reactions.  In the event of skin irritation, the patient was advised to reduce the amount of the drug applied or use it less frequently.   The patient verbalized understanding of the proper use and possible adverse effects of clindamycin.  All of the patient's questions and concerns were addressed.
Birth Control Pills Pregnancy And Lactation Text: This medication should be avoided if pregnant and for the first 30 days post-partum.
Birth Control Pills Counseling: Birth Control Pill Counseling: I discussed with the patient the potential side effects of OCPs including but not limited to increased risk of stroke, heart attack, thrombophlebitis, deep venous thrombosis, hepatic adenomas, breast changes, GI upset, headaches, and depression.  The patient verbalized understanding of the proper use and possible adverse effects of OCPs. All of the patient's questions and concerns were addressed.
Doxycycline Pregnancy And Lactation Text: This medication is Pregnancy Category D and not consider safe during pregnancy. It is also excreted in breast milk but is considered safe for shorter treatment courses.
Topical Retinoid Pregnancy And Lactation Text: This medication is Pregnancy Category C. It is unknown if this medication is excreted in breast milk.
Topical Retinoid counseling:  Patient advised to apply a pea-sized amount only at bedtime and wait 30 minutes after washing their face before applying.  If too drying, patient may add a non-comedogenic moisturizer. The patient verbalized understanding of the proper use and possible adverse effects of retinoids.  All of the patient's questions and concerns were addressed.
Sarecycline Counseling: Patient advised regarding possible photosensitivity and discoloration of the teeth, skin, lips, tongue and gums.  Patient instructed to avoid sunlight, if possible.  When exposed to sunlight, patients should wear protective clothing, sunglasses, and sunscreen.  The patient was instructed to call the office immediately if the following severe adverse effects occur:  hearing changes, easy bruising/bleeding, severe headache, or vision changes.  The patient verbalized understanding of the proper use and possible adverse effects of sarecycline.  All of the patient's questions and concerns were addressed.
Topical Clindamycin Pregnancy And Lactation Text: This medication is Pregnancy Category B and is considered safe during pregnancy. It is unknown if it is excreted in breast milk.
Tazorac Counseling:  Patient advised that medication is irritating and drying.  Patient may need to apply sparingly and wash off after an hour before eventually leaving it on overnight.  The patient verbalized understanding of the proper use and possible adverse effects of tazorac.  All of the patient's questions and concerns were addressed.
Azithromycin Pregnancy And Lactation Text: This medication is considered safe during pregnancy and is also secreted in breast milk.
Minocycline Counseling: Patient advised regarding possible photosensitivity and discoloration of the teeth, skin, lips, tongue and gums.  Patient instructed to avoid sunlight, if possible.  When exposed to sunlight, patients should wear protective clothing, sunglasses, and sunscreen.  The patient was instructed to call the office immediately if the following severe adverse effects occur:  hearing changes, easy bruising/bleeding, severe headache, or vision changes.  The patient verbalized understanding of the proper use and possible adverse effects of minocycline.  All of the patient's questions and concerns were addressed.
Benzoyl Peroxide Counseling: Patient counseled that medicine may cause skin irritation and bleach clothing.  In the event of skin irritation, the patient was advised to reduce the amount of the drug applied or use it less frequently.   The patient verbalized understanding of the proper use and possible adverse effects of benzoyl peroxide.  All of the patient's questions and concerns were addressed.
Bactrim Pregnancy And Lactation Text: This medication is Pregnancy Category D and is known to cause fetal risk.  It is also excreted in breast milk.
Aklief counseling:  Patient advised to apply a pea-sized amount only at bedtime and wait 30 minutes after washing their face before applying.  If too drying, patient may add a non-comedogenic moisturizer.  The most commonly reported side effects including irritation, redness, scaling, dryness, stinging, burning, itching, and increased risk of sunburn.  The patient verbalized understanding of the proper use and possible adverse effects of retinoids.  All of the patient's questions and concerns were addressed.
Azelaic Acid Pregnancy And Lactation Text: This medication is considered safe during pregnancy and breast feeding.
Isotretinoin Counseling: Patient should get monthly blood tests, not donate blood, not drive at night if vision affected, not share medication, and not undergo elective surgery for 6 months after tx completed. Side effects reviewed, pt to contact office should one occur.
Topical Sulfur Applications Counseling: Topical Sulfur Counseling: Patient counseled that this medication may cause skin irritation or allergic reactions.  In the event of skin irritation, the patient was advised to reduce the amount of the drug applied or use it less frequently.   The patient verbalized understanding of the proper use and possible adverse effects of topical sulfur application.  All of the patient's questions and concerns were addressed.
Azelaic Acid Counseling: Patient counseled that medicine may cause skin irritation and to avoid applying near the eyes.  In the event of skin irritation, the patient was advised to reduce the amount of the drug applied or use it less frequently.   The patient verbalized understanding of the proper use and possible adverse effects of azelaic acid.  All of the patient's questions and concerns were addressed.
Isotretinoin Pregnancy And Lactation Text: This medication is Pregnancy Category X and is considered extremely dangerous during pregnancy. It is unknown if it is excreted in breast milk.
High Dose Vitamin A Counseling: Side effects reviewed, pt to contact office should one occur.
Detail Level: Detailed
Spironolactone Pregnancy And Lactation Text: This medication can cause feminization of the male fetus and should be avoided during pregnancy. The active metabolite is also found in breast milk.
Tazorac Pregnancy And Lactation Text: This medication is not safe during pregnancy. It is unknown if this medication is excreted in breast milk.
Erythromycin Pregnancy And Lactation Text: This medication is Pregnancy Category B and is considered safe during pregnancy. It is also excreted in breast milk.
Winlevi Pregnancy And Lactation Text: This medication is considered safe during pregnancy and breastfeeding.
Winlevi Counseling:  I discussed with the patient the risks of topical clascoterone including but not limited to erythema, scaling, itching, and stinging. Patient voiced their understanding.

## 2022-07-07 NOTE — PROCEDURE: INTRALESIONAL KENALOG
Kenalog Preparation: Kenalog
Validate Note Data When Using Inventory: Yes
Medical Necessity Clause: This procedure was medically necessary because the lesions that were treated were:
X Size Of Lesion In Cm (Optional): 0
Detail Level: Simple
Include Z78.9 (Other Specified Conditions Influencing Health Status) As An Associated Diagnosis?: No
Concentration Of Solution Injected (Mg/Ml): 2.0
Total Volume Injected (Ccs- Only Use Numbers And Decimals): .1
Consent: The risks of atrophy were reviewed with the patient.

## 2022-07-18 ENCOUNTER — E-VISIT (OUTPATIENT)
Dept: MIDWIFE SERVICES | Facility: CLINIC | Age: 27
End: 2022-07-18
Payer: COMMERCIAL

## 2022-07-18 DIAGNOSIS — N39.0 ACUTE UTI (URINARY TRACT INFECTION): Primary | ICD-10-CM

## 2022-07-18 PROCEDURE — 99421 OL DIG E/M SVC 5-10 MIN: CPT | Performed by: NURSE PRACTITIONER

## 2022-07-18 RX ORDER — NITROFURANTOIN 25; 75 MG/1; MG/1
100 CAPSULE ORAL 2 TIMES DAILY
Qty: 10 CAPSULE | Refills: 0 | Status: SHIPPED | OUTPATIENT
Start: 2022-07-18 | End: 2022-07-23

## 2022-07-18 NOTE — TELEPHONE ENCOUNTER
Provider E-Visit time total (minutes): 10    Mickie SEVILLA CNM, [facility]  [phone]

## 2022-07-18 NOTE — PATIENT INSTRUCTIONS
Dear Tiffany Munoz    After reviewing your responses, I've been able to diagnose you with a urinary tract infection, which is a common infection of the bladder with bacteria.  This is not a sexually transmitted infection, though urinating immediately after intercourse can help prevent infections.  Drinking lots of fluids is also helpful to clear your current infection and prevent the next one.      I have sent a prescription for antibiotics to your pharmacy to treat this infection.    It is important that you take all of your prescribed medication even if your symptoms are improving after a few doses.  Taking all of your medicine helps prevent the symptoms from returning.     If your symptoms worsen, you develop pain in your back or stomach, develop fevers, or are not improving in 5 days, please contact your primary care provider for an appointment or visit any of our convenient Walk-in or Urgent Care Centers to be seen, which can be found on our website here.    Thanks again for choosing us as your health care partner,    ROEL Elizabeth CNM    Urinary Tract Infections in Women  Urinary tract infections (UTIs) are most often caused by bacteria. These bacteria enter the urinary tract. The bacteria may come from inside the body. Or they may travel from the skin outside the rectum or vagina into the urethra. Female anatomy makes it easy for bacteria from the bowel to enter a woman s urinary tract, which is the most common source of UTI. This means women develop UTIs more often than men. Pain in or around the urinary tract is a common UTI symptom. But the only way to know for sure if you have a UTI for the healthcare provider to test your urine. The two tests that may be done are the urinalysis and urine culture.     Types of UTIs    Cystitis. A bladder infection (cystitis) is the most common UTI in women. You may have urgent or frequent need to pee. You may also have pain, burning when you pee, and  bloody urine.    Urethritis. This is an inflamed urethra, which is the tube that carries urine from the bladder to outside the body. You may have lower stomach or back pain. You may also have urgent or frequent need to pee.    Pyelonephritis. This is a kidney infection. If not treated, it can be serious and damage your kidneys. In severe cases, you may need to stay in the hospital. You may have a fever and lower back pain.    Medicines to treat a UTI  Most UTIs are treated with antibiotics. These kill the bacteria. The length of time you need to take them depends on the type of infection. It may be as short as 3 days. If you have repeated UTIs, you may need a low-dose antibiotic for several months. Take antibiotics exactly as directed. Don t stop taking them until all of the medicine is gone. If you stop taking the antibiotic too soon, the infection may not go away. You may also develop a resistance to the antibiotic. This can make it much harder to treat.   Lifestyle changes to treat and prevent UTIs   The lifestyle changes below will help get rid of your UTI. They may also help prevent future UTIs.     Drink plenty of fluids. This includes water, juice, or other caffeine-free drinks. Fluids help flush bacteria out of your body.    Empty your bladder. Always empty your bladder when you feel the urge to pee. And always pee before going to sleep. Urine that stays in your bladder can lead to infection. Try to pee before and after sex as well.    Practice good personal hygiene. Wipe yourself from front to back after using the toilet. This helps keep bacteria from getting into the urethra.    Use condoms during sex. These help prevent UTIs caused by sexually transmitted bacteria. Also don't use spermicides during sex. These can increase the risk for UTIs. Choose other forms of birth control instead. For women who tend to get UTIs after sex, a low-dose of a preventive antibiotic may be used. Be sure to discuss this  option with your healthcare provider.    Follow up with your healthcare provider as directed. He or she may test to make sure the infection has cleared. If needed, more treatment may be started.  [employer] last reviewed this educational content on 7/1/2019 2000-2021 [employer]. All rights reserved. This information is not intended as a substitute for professional medical care. Always follow your healthcare professional's instructions.

## 2022-08-31 ENCOUNTER — E-VISIT (OUTPATIENT)
Dept: OBGYN | Facility: CLINIC | Age: 27
End: 2022-08-31
Payer: COMMERCIAL

## 2022-08-31 DIAGNOSIS — N39.0 ACUTE UTI (URINARY TRACT INFECTION): Primary | ICD-10-CM

## 2022-08-31 PROCEDURE — 99421 OL DIG E/M SVC 5-10 MIN: CPT | Performed by: ADVANCED PRACTICE MIDWIFE

## 2022-09-01 RX ORDER — NITROFURANTOIN 25; 75 MG/1; MG/1
100 CAPSULE ORAL 2 TIMES DAILY
Qty: 10 CAPSULE | Refills: 0 | Status: SHIPPED | OUTPATIENT
Start: 2022-09-01 | End: 2022-09-06

## 2022-09-01 NOTE — PATIENT INSTRUCTIONS
Dear Tiffany Munoz    After reviewing your responses, I've been able to diagnose you with a urinary tract infection, which is a common infection of the bladder with bacteria.  This is not a sexually transmitted infection, though urinating immediately after intercourse can help prevent infections.  Drinking lots of fluids is also helpful to clear your current infection and prevent the next one.      I have sent a prescription for antibiotics to your pharmacy to treat this infection.    It is important that you take all of your prescribed medication even if your symptoms are improving after a few doses.  Taking all of your medicine helps prevent the symptoms from returning.     If your symptoms worsen, you develop pain in your back or stomach, develop fevers, or are not improving in 5 days, please contact your primary care provider for an appointment or visit any of our convenient Walk-in or Urgent Care Centers to be seen, which can be found on our website here.    Thanks again for choosing us as your health care partner,    Jocelin Valerio CNM    Urinary Tract Infections in Women  Urinary tract infections (UTIs) are most often caused by bacteria. These bacteria enter the urinary tract. The bacteria may come from inside the body. Or they may travel from the skin outside the rectum or vagina into the urethra. Female anatomy makes it easy for bacteria from the bowel to enter a woman s urinary tract, which is the most common source of UTI. This means women develop UTIs more often than men. Pain in or around the urinary tract is a common UTI symptom. But the only way to know for sure if you have a UTI for the healthcare provider to test your urine. The two tests that may be done are the urinalysis and urine culture.     Types of UTIs    Cystitis. A bladder infection (cystitis) is the most common UTI in women. You may have urgent or frequent need to pee. You may also have pain, burning when you pee, and bloody  urine.    Urethritis. This is an inflamed urethra, which is the tube that carries urine from the bladder to outside the body. You may have lower stomach or back pain. You may also have urgent or frequent need to pee.    Pyelonephritis. This is a kidney infection. If not treated, it can be serious and damage your kidneys. In severe cases, you may need to stay in the hospital. You may have a fever and lower back pain.    Medicines to treat a UTI  Most UTIs are treated with antibiotics. These kill the bacteria. The length of time you need to take them depends on the type of infection. It may be as short as 3 days. If you have repeated UTIs, you may need a low-dose antibiotic for several months. Take antibiotics exactly as directed. Don t stop taking them until all of the medicine is gone. If you stop taking the antibiotic too soon, the infection may not go away. You may also develop a resistance to the antibiotic. This can make it much harder to treat.   Lifestyle changes to treat and prevent UTIs   The lifestyle changes below will help get rid of your UTI. They may also help prevent future UTIs.     Drink plenty of fluids. This includes water, juice, or other caffeine-free drinks. Fluids help flush bacteria out of your body.    Empty your bladder. Always empty your bladder when you feel the urge to pee. And always pee before going to sleep. Urine that stays in your bladder can lead to infection. Try to pee before and after sex as well.    Practice good personal hygiene. Wipe yourself from front to back after using the toilet. This helps keep bacteria from getting into the urethra.    Use condoms during sex. These help prevent UTIs caused by sexually transmitted bacteria. Also don't use spermicides during sex. These can increase the risk for UTIs. Choose other forms of birth control instead. For women who tend to get UTIs after sex, a low-dose of a preventive antibiotic may be used. Be sure to discuss this option with  your healthcare provider.    Follow up with your healthcare provider as directed. He or she may test to make sure the infection has cleared. If needed, more treatment may be started.  [employer] last reviewed this educational content on 7/1/2019 2000-2021 [employer]. All rights reserved. This information is not intended as a substitute for professional medical care. Always follow your healthcare professional's instructions.

## 2022-10-23 ENCOUNTER — HEALTH MAINTENANCE LETTER (OUTPATIENT)
Age: 27
End: 2022-10-23

## 2022-11-29 NOTE — PROCEDURE: MEDICATION COUNSELING
Biopsy Type: H and E Topical Sulfur Applications Pregnancy And Lactation Text: This medication is Pregnancy Category C and has an unknown safety profile during pregnancy. It is unknown if this topical medication is excreted in breast milk.

## 2023-01-27 ENCOUNTER — E-VISIT (OUTPATIENT)
Dept: URGENT CARE | Facility: CLINIC | Age: 28
End: 2023-01-27
Payer: COMMERCIAL

## 2023-01-27 DIAGNOSIS — J06.9 VIRAL URI: Primary | ICD-10-CM

## 2023-01-27 PROCEDURE — 99421 OL DIG E/M SVC 5-10 MIN: CPT | Performed by: EMERGENCY MEDICINE

## 2023-01-27 NOTE — PATIENT INSTRUCTIONS
The symptoms you describe suggest a viral cause, which is much more common than a bacterial cause. Antibiotics will treat bacterial infections, but have no effect on viral infections. If possible, especially if improving, start with symptom care for the first 7-10 days, then consider seeking further treatment or taking an antibiotic. Bacterial infections generally are more severe, including symptoms such as pus, fever over 101degrees F, or rapidly worsening.    Viral Upper Respiratory Illness (Adult)    You have a viral upper respiratory illness (URI), which is another term for the common cold. This illness is contagious during the first few days. It is spread through the air by coughing and sneezing. It may also be spread by direct contact (touching the sick person and then touching your own eyes, nose, or mouth). Frequent handwashing will decrease risk of spread. Most viral illnesses go away within 7 to 10 days with rest and simple home remedies. Sometimes the illness may last for several weeks. Antibiotics will not kill a virus, and they are generally not prescribed for this condition.  Home care    If symptoms are severe, rest at home for the first 2 to 3 days. When you resume activity, don't let yourself get too tired.    Don't smoke. If you need help stopping, talk with your healthcare provider.    Avoid being exposed to cigarette smoke (yours or others ).    You may use acetaminophen or ibuprofen to control pain and fever, unless another medicine was prescribed. If you have chronic liver or kidney disease, have ever had a stomach ulcer or gastrointestinal bleeding, or are taking blood-thinning medicines, talk with your healthcare provider before using these medicines. Aspirin should never be given to anyone under 18 years of age who is ill with a viral infection or fever. It may cause severe liver or brain damage.    Your appetite may be poor, so a light diet is fine. Stay well hydrated by drinking 6 to 8  glasses of fluids per day (water, soft drinks, juices, tea, or soup). Extra fluids will help loosen secretions in the nose and lungs.    Over-the-counter cold medicines will not shorten the length of time you re sick, but they may be helpful for the following symptoms: cough, sore throat, and nasal and sinus congestion. If you take prescription medicines, ask your healthcare provider or pharmacist which over-the-counter medicines are safe to use. (Note: Don't use decongestants if you have high blood pressure.)  Follow-up care  Follow up with your healthcare provider, or as advised.  When to seek medical advice  Call your healthcare provider right away if any of these occur:    Cough with lots of colored sputum (mucus)    Severe headache; face, neck, or ear pain    Difficulty swallowing due to throat pain    Fever of 100.4 F (38 C) or higher, or as directed by your healthcare provider  Call 911  Call 911 if any of these occur:    Chest pain, shortness of breath, wheezing, or difficulty breathing    Coughing up blood    Very severe pain with swallowing, especially if it goes along with a muffled voice   [employer] last reviewed this educational content on 6/1/2018 2000-2021 [employer]. All rights reserved. This information is not intended as a substitute for professional medical care. Always follow your healthcare professional's instructions.        Dear Tiffany Munoz          Based on your responses and diagnosis,you condition is caused by a virus. Reconnect in 4-5 days if no better.    It is also important to stay well hydrated, get lots of rest and take over-the-counter decongestants,?tylenol?or ibuprofen if you?are able to?take those medications per your primary care provider to help relieve discomfort.?     It is important that you take?all of?your prescribed medication even if your symptoms are improving after a few doses.? Taking?all of?your medicine helps prevent the symptoms from  returning.?     If your symptoms worsen, you develop severe headache, vomiting, high fever (>102), or are not improving in 7 days, please contact your primary care provider for an appointment or visit any of our convenient Walk-in Care or Urgent Care Centers to be seen which can be found on our website?here.?     Thanks again for choosing?us?as your health care partner,?   ?  Koko Mendez MD?

## 2023-02-24 ENCOUNTER — LAB (OUTPATIENT)
Dept: LAB | Facility: CLINIC | Age: 28
End: 2023-02-24
Payer: COMMERCIAL

## 2023-02-24 DIAGNOSIS — Z01.419 ENCOUNTER FOR GYNECOLOGICAL EXAMINATION WITHOUT ABNORMAL FINDING: ICD-10-CM

## 2023-02-24 LAB
CHOLEST SERPL-MCNC: 170 MG/DL
HDLC SERPL-MCNC: 83 MG/DL
LDLC SERPL CALC-MCNC: 76 MG/DL
NONHDLC SERPL-MCNC: 87 MG/DL
TRIGL SERPL-MCNC: 53 MG/DL
TSH SERPL DL<=0.005 MIU/L-ACNC: 1.6 UIU/ML (ref 0.3–4.2)

## 2023-02-24 PROCEDURE — 36415 COLL VENOUS BLD VENIPUNCTURE: CPT

## 2023-02-24 PROCEDURE — 80061 LIPID PANEL: CPT

## 2023-02-24 PROCEDURE — 84443 ASSAY THYROID STIM HORMONE: CPT

## 2023-03-29 NOTE — TELEPHONE ENCOUNTER
"    Outpatient Physical Therapy Vestibular Treatment Note  [facility]     Patient Name: Michela Kovacs  : 2000  MRN: [id number]  Today's Date: 3/29/2023      Visit Date: 2023    Attendance:  (90/yr)  Subjective Improvement: 90%  Next MD Appt: none with PCP  Recert Date: 4/3/23     Therapy Diagnosis: 1) Vertigo/Dizziness; 2) Headaches     Visit Dx:     ICD-10-CM ICD-9-CM   1. Dizziness  R42 780.4            Vestibular Eval     Row Name 23 1300             Subjective Comments    Subjective Comments Headaches and dizziness are better. Occasional symptoms. \"I can still function\" when has dizziness or headache. 90% subjective improvement.  -SS         Pain Assessment    Pain Score 0  -SS      Post Pain Score 2  -SS         Vestibular Objective    General Observation forward head, rounded shoulders  -SS         Oculomotor Exam Fixation Present    Ocular ROM Normal  -SS      Spontaneous Nystagmus Absent  -SS      Gaze-induced Nystagmus Absent  -SS      Head Shaking Horizontal --  no symptoms eyes open; \"a little bit\" dizziness eyes closed  -SS      Head Shaking Vertical --  no symptoms eyes open; \"maybe slight\" dizziness eyes closed  -SS         Positional Testing    Positional Testing Without infrared goggles   -SS       Deep Head Hang no nystagmus, slight dizziness  -SS      Burbank-Hallpike Right No nystagmus  negative  -SS      Burbank-Hallpike Left No nystagmus  negative  -SS      Horizontal Roll Test Right No nystagmus  negative  -SS      Horizontal Roll Test Left No nystagmus  negative  -SS         Cervicogenic Assessment    Cervicogenic Assess Increased tone R suboccipital and cervical paraspinal muscles. Slight L rotation of cervical spine.  -SS            User Key  (r) = Recorded By, (t) = Taken By, (c) = Cosigned By    Initials Name Provider Type    SS Deven Andrews, PT, DPT, CHT Physical Therapist                             PT Assessment/Plan     Row Name 23 1400    " 3/20/17 Annual. Pt had IUD removed and Nexplanon inserted on 3/20/17 by CE. Pt had not had cycle for about 2.5 years. Two weeks ago pt started bleeding. Pt has been bleeding for 2 weeks now. Bleeding was bright red and now a darker red or brown. Pt denies passing clots or having cramping. Pt wondering if this is normal with the nexplanon. Routing to Dr. Huffman. Please review and advise.    "      PT Assessment    Functional Limitations Limitation in home management;Limitations in community activities;Limitations in functional capacity and performance;Performance in leisure activities;Performance in self-care ADL;Performance in work activities  -     Impairments Pain;Joint mobility  -SS     Assessment Comments Feels a little dizzy and pressure/pain in cervical spine post-treatment. Cervical alignment WFLs post-manual. Felt some dizziness after Deep Head Hang Maneuver.  -     Rehab Potential --  Good/fair.  -SS     Patient/caregiver participated in establishment of treatment plan and goals Yes  -SS     Patient would benefit from skilled therapy intervention Yes  -SS        PT Plan    PT Frequency 2x/week  -SS     Predicted Duration of Therapy Intervention (PT) 3-4 weeks  -     PT Plan Comments Continue POC. Recheck scheduled 4/5/23.  -           User Key  (r) = Recorded By, (t) = Taken By, (c) = Cosigned By    Initials Name Provider Type    Deven Huntley, PT, DPT, CHT Physical Therapist                    OP Exercises     Row Name 03/29/23 1300             Subjective Comments    Subjective Comments Headaches and dizziness are better. Occasional symptoms. \"I can still function\" when has dizziness or headache. 90% subjective improvement.  -SS         Exercise 1    Exercise Name 1 Deep Head Hang maneuver  -      Cueing 1 Verbal;Tactile  -SS      Sets 1 2  -SS         Exercise 2    Exercise Name 2 Slow head turns with eyes closed  -      Cueing 2 Verbal;Demo;Tactile  -SS      Sets 2 3  -SS      Time 2 20 sec  -      Additional Comments 60 bpm  -SS         Exercise 3    Exercise Name 3 Slow head nods with eyes closed  -      Cueing 3 Verbal;Demo  -SS      Sets 3 2  -SS      Reps 3 10  -SS         Exercise 4    Exercise Name 4 see Manual  -            User Key  (r) = Recorded By, (t) = Taken By, (c) = Cosigned By    Initials Name Provider Type    Deven Huntley, PT, " DPT, CHT Physical Therapist              Manual Rx (last 36 hours)     Manual Treatments     Row Name 03/29/23 1300             Manual Rx 1    Manual Rx 1 Type ME cervical rotation correct  -SS         Manual Rx 2    Manual Rx 2 Location B cervical paraspinals  -SS      Manual Rx 2 Type MFR & manual stretching  -SS         Manual Rx 3    Manual Rx 3 Location B suboccipital muscles  -SS      Manual Rx 3 Type MFR & manual stretching  -SS            User Key  (r) = Recorded By, (t) = Taken By, (c) = Cosigned By    Initials Name Provider Type    Deven Huntley, PT, DPT, CHT Physical Therapist                           PT OP Goals     Row Name 03/29/23 1300          PT Short Term Goals    STG Date to Achieve 04/03/23  -SS     STG 1 Note  >/= 50% subjective improvement.  -SS     STG 1 Progress Met  -     STG 2 Negative BPPV testing.  -SS     STG 2 Progress Not Met  -SS        Long Term Goals    LTG Date to Achieve 05/15/23  -     LTG 1 Independent with HEP/self-management.  -SS     LTG 1 Progress Ongoing;Progressing  -     LTG 2 Resolution of daily headaches.  -SS     LTG 2 Progress Met  -     LTG 3 Resume PLOF.  -     LTG 3 Progress Ongoing;Progressing  -        Time Calculation    PT Goal Re-Cert Due Date 04/03/23  -           User Key  (r) = Recorded By, (t) = Taken By, (c) = Cosigned By    Initials Name Provider Type    Deven Huntley, PT, DPT, CHT Physical Therapist                Therapy Education  Education Details: head turns and head nods with eyes closed  Given: HEP  How Provided: Verbal, Demonstration  Provided to: Patient  Level of Understanding: Verbalized, Demonstrated              Time Calculation:   Start Time: 1346  Stop Time: 1420  Time Calculation (min): 34 min   Therapy Charges for Today     Code Description Service Date Service Provider Modifiers Qty    17799025747 HC PT THER PROC EA 15 MIN 3/29/2023 Deven Andrews, PT, DPT, CHT GP 2                   Deven  Vale Andrews, PT, DPT, CHT  3/29/2023

## 2023-06-18 ENCOUNTER — HEALTH MAINTENANCE LETTER (OUTPATIENT)
Age: 28
End: 2023-06-18

## 2023-07-05 ENCOUNTER — E-VISIT (OUTPATIENT)
Dept: MIDWIFE SERVICES | Facility: CLINIC | Age: 28
End: 2023-07-05

## 2023-07-05 DIAGNOSIS — R30.0 DYSURIA: ICD-10-CM

## 2023-07-05 DIAGNOSIS — N39.0 ACUTE UTI (URINARY TRACT INFECTION): ICD-10-CM

## 2023-07-05 PROCEDURE — 99422 OL DIG E/M SVC 11-20 MIN: CPT | Performed by: NURSE PRACTITIONER

## 2023-07-05 RX ORDER — PHENAZOPYRIDINE HYDROCHLORIDE 100 MG/1
100 TABLET, FILM COATED ORAL 3 TIMES DAILY PRN
Qty: 6 TABLET | Refills: 0 | Status: SHIPPED | OUTPATIENT
Start: 2023-07-05 | End: 2023-09-14

## 2023-07-05 RX ORDER — NITROFURANTOIN 25; 75 MG/1; MG/1
100 CAPSULE ORAL 2 TIMES DAILY
Qty: 10 CAPSULE | Refills: 0 | Status: SHIPPED | OUTPATIENT
Start: 2023-07-05 | End: 2023-07-10

## 2023-07-05 NOTE — TELEPHONE ENCOUNTER
Provider E-Visit time total (minutes): 13    Mickie SEVILLA CNM, [facility]  [phone]

## 2023-07-05 NOTE — PATIENT INSTRUCTIONS
Dear Tiffany Munoz    After reviewing your responses, I've been able to diagnose you with a urinary tract infection, which is a common infection of the bladder with bacteria.  This is not a sexually transmitted infection, though urinating immediately after intercourse can help prevent infections.  Drinking lots of fluids is also helpful to clear your current infection and prevent the next one.      I have sent a prescription for antibiotics to your pharmacy to treat this infection.    It is important that you take all of your prescribed medication even if your symptoms are improving after a few doses.  Taking all of your medicine helps prevent the symptoms from returning.     If your symptoms worsen, you develop pain in your back or stomach, develop fevers, or are not improving in 5 days, please contact your primary care provider for an appointment or visit any of our convenient Walk-in or Urgent Care Centers to be seen, which can be found on our website here.    Thanks again for choosing us as your health care partner,    ROEL Elizabeth CNM    Urinary Tract Infections in Women  Urinary tract infections (UTIs) are most often caused by bacteria. These bacteria enter the urinary tract. The bacteria may come from inside the body. Or they may travel from the skin outside the rectum or vagina into the urethra. Female anatomy makes it easy for bacteria from the bowel to enter a woman s urinary tract, which is the most common source of UTI. This means women develop UTIs more often than men. Pain in or around the urinary tract is a common UTI symptom. But the only way to know for sure if you have a UTI for the healthcare provider to test your urine. The two tests that may be done are the urinalysis and urine culture.    Types of UTIs    Cystitis. A bladder infection (cystitis) is the most common UTI in women. You may have urgent or frequent need to pee. You may also have pain, burning when you pee, and  bloody urine.    Urethritis. This is an inflamed urethra, which is the tube that carries urine from the bladder to outside the body. You may have lower stomach or back pain. You may also have urgent or frequent need to pee.    Pyelonephritis. This is a kidney infection. If not treated, it can be serious and damage your kidneys. In severe cases, you may need to stay in the hospital. You may have a fever and lower back pain.    Medicines to treat a UTI  Most UTIs are treated with antibiotics. These kill the bacteria. The length of time you need to take them depends on the type of infection. It may be as short as 3 days. If you have repeated UTIs, you may need a low-dose antibiotic for several months. Take antibiotics exactly as directed. Don t stop taking them until all of the medicine is gone, even if you feel better. If you stop taking the antibiotic too soon, the infection may not go away. You may also develop a resistance to the antibiotic. This can make it much harder to treat.  Lifestyle changes to treat and prevent UTIs  The lifestyle changes below will help get rid of your UTI. They may also help prevent future UTIs.    Drink plenty of fluids. This includes water, juice, or other caffeine-free drinks. Fluids help flush bacteria out of your body.    Empty your bladder. Always empty your bladder when you feel the urge to pee. And always pee before going to sleep. Urine that stays in your bladder can lead to infection. Try to pee before and after sex as well.    Practice good personal hygiene. Wipe yourself from front to back after using the toilet. This helps keep bacteria from getting into the urethra.    Wear cotton underwear. Don't wear synthetic or tight-fitting underwear that can trap moisture. Change out of wet bathing suits and workout clothing quickly.    Take showers. Showers are better than baths for preventing UTIs.    Use condoms during sex. These help prevent UTIs caused by sexually transmitted  bacteria. Also don't use spermicides during sex. These can increase the risk for UTIs. Choose other forms of birth control instead. For women who tend to get UTIs after sex, a low-dose of a preventive antibiotic may be used. Be sure to discuss this option with your healthcare provider.    Follow up with your healthcare provider as directed. They may test to make sure the infection has cleared. If needed, more treatment may be started.  [employer] last reviewed this educational content on 9/1/2021 2000-2023 [employer]. All rights reserved. This information is not intended as a substitute for professional medical care. Always follow your healthcare professional's instructions.          Understanding Urinary Tract Infections (UTIs)  Most UTIs are caused by bacteria. But they may also be caused by viruses or fungi. Bacteria from the bowel are the most common source of infection. The infection may start due to any of these:    Having sex. During sex, bacteria can go from the penis, vagina, or rectum into the urethra.     Bacteria outside the rectum getting into the urethra. Bacteria on the skin outside the rectum may go into the urethra. This is more common in people who were assigned female at birth. That's because for them, the rectum and urethra are closer to each other than in people who were assigned male at birth. Wiping from front to back after using the toilet can help prevent germs from getting to the urethra. So can keeping the area clean.    Blocked urine flow through the urinary tract. If urine sits too long, germs may start to grow out of control.  Parts of the urinary tract  The infection can occur in any part of the urinary tract.      The kidneys. These organs filter blood. They remove wastes and extra water to make urine.    The ureters. These tubes carry urine from the kidneys to the bladder.    The bladder. This holds urine until you're ready to pee.    The urethra. This tube carries  urine from the bladder out of the body. It's shorter in people who were assigned female at birth. So for them, bacteria can move through it more easily. The urethra is longer in people who were assigned male at birth. So a UTI is less likely to reach the bladder or kidneys for them.  [employer] last reviewed this educational content on 11/1/2021 2000-2023 [employer]. All rights reserved. This information is not intended as a substitute for professional medical care. Always follow your healthcare professional's instructions.

## 2023-07-25 NOTE — PROGRESS NOTES
"  SUBJECTIVE:                                                    Tiffany Munoz is a 21 year old female who presents to clinic today for the following health issues:      RESPIRATORY SYMPTOMS      Duration: 1 day    Description  sore throat, fever, ear pain both, fatigue/malaise and nausea    Severity: moderate    Accompanying signs and symptoms: None    History (predisposing factors):  strep exposure    Precipitating or alleviating factors: None    Therapies tried and outcome:  none    at elementary school.  Kids are always coming close to her        Problem list and histories reviewed & adjusted, as indicated.  Additional history: as documented    Labs reviewed in EPIC    Reviewed and updated as needed this visit by clinical staff  Allergies  Meds       Reviewed and updated as needed this visit by Provider         ROS:  CONSTITUTIONAL:POSITIVE  for fever   ENT/MOUTH: POSITIVE for postnasal drainage, sore throat and swollen glands  RESP:NEGATIVE for significant cough or SOB    OBJECTIVE:                                                    /68  Pulse 94  Temp 99.6  F (37.6  C) (Tympanic)  Resp 16  Ht 5' 10\" (1.778 m)  Wt 152 lb (68.9 kg)  SpO2 97%  BMI 21.81 kg/m2  Body mass index is 21.81 kg/(m^2).  GENERAL APPEARANCE: healthy, alert and no distress  HENT: ear canals and TM's normal, nose and mouth without ulcers or lesions, oral mucous membranes moist and tonsillar erythema  NECK: no asymmetry, masses, or scars, thyroid normal to palpation and cervical adenopathy reactive anterior nodes  RESP: lungs clear to auscultation - no rales, rhonchi or wheezes    Diagnostic test results:  Strep screen - Positive     ASSESSMENT/PLAN:                                                        ICD-10-CM    1. Throat pain R07.0 Rapid strep screen       Follow up with Provider - as needed    Patient Instructions   Symptomatic treatment.  Will use saline gargles, tylenol and/or advil. Suck on  " lozenges as needed. Push fluids. Salt water nasal spray as needed.      Andrew River MD  [facility]     normal... Well appearing, awake, alert, oriented to person, place, time/situation and in no apparent distress.

## 2023-09-14 ENCOUNTER — OFFICE VISIT (OUTPATIENT)
Dept: URGENT CARE | Facility: URGENT CARE | Age: 28
End: 2023-09-14

## 2023-09-14 ENCOUNTER — E-VISIT (OUTPATIENT)
Dept: URGENT CARE | Facility: CLINIC | Age: 28
End: 2023-09-14

## 2023-09-14 ENCOUNTER — MYC REFILL (OUTPATIENT)
Dept: MIDWIFE SERVICES | Facility: CLINIC | Age: 28
End: 2023-09-14

## 2023-09-14 VITALS
HEART RATE: 62 BPM | OXYGEN SATURATION: 99 % | SYSTOLIC BLOOD PRESSURE: 111 MMHG | DIASTOLIC BLOOD PRESSURE: 77 MMHG | TEMPERATURE: 98 F

## 2023-09-14 DIAGNOSIS — B96.89 BACTERIAL VAGINOSIS: ICD-10-CM

## 2023-09-14 DIAGNOSIS — N30.01 ACUTE CYSTITIS WITH HEMATURIA: ICD-10-CM

## 2023-09-14 DIAGNOSIS — R30.0 DYSURIA: Primary | ICD-10-CM

## 2023-09-14 DIAGNOSIS — N39.0 ACUTE UTI (URINARY TRACT INFECTION): ICD-10-CM

## 2023-09-14 DIAGNOSIS — N76.0 BACTERIAL VAGINOSIS: ICD-10-CM

## 2023-09-14 DIAGNOSIS — R30.0 DYSURIA: ICD-10-CM

## 2023-09-14 LAB
ALBUMIN UR-MCNC: NEGATIVE MG/DL
APPEARANCE UR: CLEAR
BACTERIA #/AREA URNS HPF: ABNORMAL /HPF
BILIRUB UR QL STRIP: NEGATIVE
CLUE CELLS: PRESENT
COLOR UR AUTO: YELLOW
GLUCOSE UR STRIP-MCNC: NEGATIVE MG/DL
HGB UR QL STRIP: ABNORMAL
KETONES UR STRIP-MCNC: NEGATIVE MG/DL
LEUKOCYTE ESTERASE UR QL STRIP: ABNORMAL
NITRATE UR QL: NEGATIVE
PH UR STRIP: 6 [PH] (ref 5–7)
RBC #/AREA URNS AUTO: ABNORMAL /HPF
SP GR UR STRIP: 1.01 (ref 1–1.03)
SQUAMOUS #/AREA URNS AUTO: ABNORMAL /LPF
TRICHOMONAS, WET PREP: ABNORMAL
UROBILINOGEN UR STRIP-ACNC: 0.2 E.U./DL
WBC #/AREA URNS AUTO: ABNORMAL /HPF
WBC'S/HIGH POWER FIELD, WET PREP: ABNORMAL
YEAST, WET PREP: ABNORMAL

## 2023-09-14 PROCEDURE — 99421 OL DIG E/M SVC 5-10 MIN: CPT | Performed by: PREVENTIVE MEDICINE

## 2023-09-14 PROCEDURE — 81001 URINALYSIS AUTO W/SCOPE: CPT | Performed by: PHYSICIAN ASSISTANT

## 2023-09-14 PROCEDURE — 87086 URINE CULTURE/COLONY COUNT: CPT | Performed by: PHYSICIAN ASSISTANT

## 2023-09-14 PROCEDURE — 99214 OFFICE O/P EST MOD 30 MIN: CPT | Performed by: PHYSICIAN ASSISTANT

## 2023-09-14 PROCEDURE — 87210 SMEAR WET MOUNT SALINE/INK: CPT | Performed by: PHYSICIAN ASSISTANT

## 2023-09-14 RX ORDER — METRONIDAZOLE 500 MG/1
500 TABLET ORAL 2 TIMES DAILY
Qty: 14 TABLET | Refills: 0 | Status: SHIPPED | OUTPATIENT
Start: 2023-09-14 | End: 2023-09-21

## 2023-09-14 RX ORDER — NITROFURANTOIN 25; 75 MG/1; MG/1
100 CAPSULE ORAL 2 TIMES DAILY
Qty: 10 CAPSULE | Refills: 0 | Status: SHIPPED | OUTPATIENT
Start: 2023-09-14 | End: 2023-10-18

## 2023-09-14 ASSESSMENT — ENCOUNTER SYMPTOMS: DYSURIA: 1

## 2023-09-14 NOTE — PATIENT INSTRUCTIONS
Dear Tiffany Munoz,     After reviewing your responses, I would like you to come in for a urine test to make sure we treat you correctly. This urine test is to evaluate you for a possible urinary tract infection, and should be scheduled for today or tomorrow. Schedule a Lab Only appointment here.     Lab appointments are not available at most locations on the weekends. If no Lab Only appointment is available, you should be seen in any of our convenient Walk-in or Urgent Care Centers, which can be found on our website here.     You will receive instructions with your results in [employer] once they are available.     If your symptoms worsen, you develop pain in your back or stomach, develop fevers, or are not improving in 5 days, please contact your primary care provider for an appointment or visit a Walk-in or Urgent Care Center to be seen.     Thanks again for choosing us as your health care partner,     Rick Shipman MD, MD

## 2023-09-15 RX ORDER — PHENAZOPYRIDINE HYDROCHLORIDE 100 MG/1
100 TABLET, FILM COATED ORAL 3 TIMES DAILY PRN
Qty: 6 TABLET | Refills: 0 | Status: SHIPPED | OUTPATIENT
Start: 2023-09-15 | End: 2023-09-17

## 2023-09-15 NOTE — PROGRESS NOTES
SUBJECTIVE:   Tiffany Munoz is a 28 year old female presenting with a chief complaint of   Chief Complaint   Patient presents with    Urinary Problem     Dysuria since this morning     Dysuria since this morning, usually has OBGYN fill Macrobid prescription but today had online virtual appointment and had to get labs done, could not get in until tomorrow    Gets frequent UTIs  Last UTI: 7/5/23    Denies fever, back pain, and hematuria     She is an established patient of [facility].      Review of Systems   Genitourinary:  Positive for dysuria.       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    biopsy done 5/2021    Menorrhagia      Family History   Problem Relation Age of Onset    Skin Cancer Mother 55    Unknown/Adopted Father     No Known Problems Sister     No Known Problems Brother     Cerebrovascular Disease Maternal Grandmother 70    Breast Cancer Maternal Grandmother 40    No Known Problems Maternal Grandfather     Heart Failure Paternal Grandfather     Irregular heart beat Paternal Grandfather     No Known Problems Daughter     No Known Problems Son     No Known Problems Maternal Half-Brother     No Known Problems Maternal Half-Sister     No Known Problems Paternal Half-Brother     No Known Problems Paternal Half-Sister     No Known Problems Niece     No Known Problems Nephew     Thyroid Cancer Cousin 30    No Known Problems Other     Cancer No family hx of     Heart Disease No family hx of     Diabetes No family hx of     Hypertension No family hx of     Hyperlipidemia No family hx of     Colon Cancer No family hx of     Prostate Cancer No family hx of     Depression No family hx of     Anxiety Disorder No family hx of     Substance Abuse No family hx of     Anesthesia Reaction No family hx of     Asthma No family hx of     Osteoporosis No family hx of     Genetic Disorder No family hx of      Current Outpatient Medications   Medication Sig Dispense Refill     phenazopyridine (PYRIDIUM) 100 MG tablet Take 1 tablet (100 mg) by mouth 3 times daily as needed for urinary tract discomfort (Patient not taking: Reported on 9/14/2023) 6 tablet 0    tretinoin (RETIN-A) 0.025 % external cream Apply thin layer to face every night for Acne. Follow with a moisturizer. Start every other night until able to tolerate EVERY NIGHT       Social History     Tobacco Use    Smoking status: Never    Smokeless tobacco: Never   Substance Use Topics    Alcohol use: Yes     Alcohol/week: 6.0 standard drinks of alcohol     Types: 3 Glasses of wine, 3 Cans of beer per week     Comment: approx 6 drinks/week       OBJECTIVE  /77 (BP Location: Right arm, Patient Position: Sitting, Cuff Size: Adult Regular)   Pulse 62   Temp 98  F (36.7  C) (Oral)   LMP 08/28/2023 (Approximate)   SpO2 99%     Physical Exam  Constitutional:       General: She is not in acute distress.     Appearance: Normal appearance. She is normal weight. She is not ill-appearing, toxic-appearing or diaphoretic.   Eyes:      Conjunctiva/sclera: Conjunctivae normal.   Cardiovascular:      Rate and Rhythm: Normal rate and regular rhythm.      Heart sounds: Normal heart sounds.   Pulmonary:      Effort: Pulmonary effort is normal.      Breath sounds: Normal breath sounds.   Abdominal:      Tenderness: There is no right CVA tenderness or left CVA tenderness.   Skin:     General: Skin is warm and dry.   Neurological:      General: No focal deficit present.      Mental Status: She is alert and oriented to person, place, and time. Mental status is at baseline.   Psychiatric:         Mood and Affect: Mood normal.         Behavior: Behavior normal.         Thought Content: Thought content normal.         Judgment: Judgment normal.         Labs:  Results for orders placed or performed in visit on 09/14/23 (from the past 24 hour(s))   UA Macroscopic with reflex to Microscopic and Culture - Clinic Collect    Specimen: Urine, Midstream    Result Value Ref Range    Color Urine Yellow Colorless, Straw, Light Yellow, Yellow    Appearance Urine Clear Clear    Glucose Urine Negative Negative mg/dL    Bilirubin Urine Negative Negative    Ketones Urine Negative Negative mg/dL    Specific Gravity Urine 1.010 1.003 - 1.035    Blood Urine Small (A) Negative    pH Urine 6.0 5.0 - 7.0    Protein Albumin Urine Negative Negative mg/dL    Urobilinogen Urine 0.2 0.2, 1.0 E.U./dL    Nitrite Urine Negative Negative    Leukocyte Esterase Urine Small (A) Negative   Wet prep - Clinic Collect    Specimen: Vagina; Swab   Result Value Ref Range    Trichomonas Absent Absent    Yeast Absent Absent    Clue Cells Present (A) Absent    WBCs/high power field 2+ (A) None   UA Microscopic with Reflex to Culture   Result Value Ref Range    Bacteria Urine Few (A) None Seen /HPF    RBC Urine 0-2 0-2 /HPF /HPF    WBC Urine 10-25 (A) 0-5 /HPF /HPF    Squamous Epithelials Urine Few (A) None Seen /LPF       X-Ray was not done.    ASSESSMENT:      ICD-10-CM    1. Dysuria  R30.0 UA Macroscopic with reflex to Microscopic and Culture - Clinic Collect     Wet prep - Clinic Collect     UA Microscopic with Reflex to Culture     Urine Culture      2. Acute cystitis with hematuria  N30.01       3. Bacterial vaginosis  N76.0     B96.89            Medical Decision Making:    Differential Diagnosis:  UTI, Dysuria, Pyelonephritis, Urethritis, and Vaginitis:  yeast, bacterial vaginosis    Serious Comorbid Conditions:  Adult:  None    PLAN:    Prescribed Macrobid. Also found to have bacterial vaginosis in UA, prescribed Flagyl. Provided education that she should avoid alcohol and should take this on full stomach with this medication. Discussed red flag symptoms and recommended she seek medical attention if experiencing these. Also encouraged to follow up with PCP in a week if no improvement of symptoms.    Followup:    If not improving or if condition worsens, follow up with your Primary Care  Provider    There are no Patient Instructions on file for this visit.    JACQUELINE Castro Student

## 2023-09-15 NOTE — TELEPHONE ENCOUNTER
Requested Prescriptions   Pending Prescriptions Disp Refills    phenazopyridine (PYRIDIUM) 100 MG tablet 6 tablet 0     Sig: Take 1 tablet (100 mg) by mouth 3 times daily as needed for urinary tract discomfort       There is no refill protocol information for this order        Last Written Prescription Date:  7/5/23  Last Fill Quantity: #6, 0 refills  Last office visit: seen in UC yesterday for dysuria.:Prescribed Macrobid. Also found to have bacterial vaginosis in UA, prescribed Flagyl. Provided education that she should avoid alcohol and should take this on full stomach with this medication. Discussed red flag symptoms and recommended she seek medical attention if experiencing these. Also encouraged to follow up with PCP in a week if no improvement of symptoms.         Routing refill request to provider for review/approval because:  Drug not on the [facility] refill protocol       Robina Cameron RN on 9/15/2023 at 10:27 AM

## 2023-09-16 LAB — BACTERIA UR CULT: NO GROWTH

## 2023-10-16 ENCOUNTER — APPOINTMENT (OUTPATIENT)
Dept: URBAN - METROPOLITAN AREA CLINIC 256 | Age: 28
Setting detail: DERMATOLOGY
End: 2023-10-16

## 2023-10-16 VITALS — WEIGHT: 145 LBS | HEIGHT: 71 IN

## 2023-10-16 DIAGNOSIS — Z71.89 OTHER SPECIFIED COUNSELING: ICD-10-CM

## 2023-10-16 DIAGNOSIS — D22 MELANOCYTIC NEVI: ICD-10-CM

## 2023-10-16 DIAGNOSIS — D18.0 HEMANGIOMA: ICD-10-CM

## 2023-10-16 DIAGNOSIS — L57.8 OTHER SKIN CHANGES DUE TO CHRONIC EXPOSURE TO NONIONIZING RADIATION: ICD-10-CM

## 2023-10-16 DIAGNOSIS — L70.0 ACNE VULGARIS: ICD-10-CM

## 2023-10-16 PROBLEM — D22.72 MELANOCYTIC NEVI OF LEFT LOWER LIMB, INCLUDING HIP: Status: ACTIVE | Noted: 2023-10-16

## 2023-10-16 PROBLEM — D22.61 MELANOCYTIC NEVI OF RIGHT UPPER LIMB, INCLUDING SHOULDER: Status: ACTIVE | Noted: 2023-10-16

## 2023-10-16 PROBLEM — D22.71 MELANOCYTIC NEVI OF RIGHT LOWER LIMB, INCLUDING HIP: Status: ACTIVE | Noted: 2023-10-16

## 2023-10-16 PROBLEM — D22.62 MELANOCYTIC NEVI OF LEFT UPPER LIMB, INCLUDING SHOULDER: Status: ACTIVE | Noted: 2023-10-16

## 2023-10-16 PROBLEM — D22.5 MELANOCYTIC NEVI OF TRUNK: Status: ACTIVE | Noted: 2023-10-16

## 2023-10-16 PROBLEM — D18.01 HEMANGIOMA OF SKIN AND SUBCUTANEOUS TISSUE: Status: ACTIVE | Noted: 2023-10-16

## 2023-10-16 PROCEDURE — OTHER MIPS QUALITY: OTHER

## 2023-10-16 PROCEDURE — OTHER COUNSELING: OTHER

## 2023-10-16 PROCEDURE — OTHER PRESCRIPTION MEDICATION MANAGEMENT: OTHER

## 2023-10-16 PROCEDURE — OTHER SUNSCREEN RECOMMENDATIONS: OTHER

## 2023-10-16 PROCEDURE — 99214 OFFICE O/P EST MOD 30 MIN: CPT

## 2023-10-16 PROCEDURE — OTHER PRESCRIPTION: OTHER

## 2023-10-16 RX ORDER — SULFACETAMIDE SODIUM AND SULFUR 10; 5 MG/G; MG/G
RINSE TOPICAL BID
Qty: 340.2 | Refills: 2 | Status: ERX | COMMUNITY
Start: 2023-10-16

## 2023-10-16 ASSESSMENT — LOCATION SIMPLE DESCRIPTION DERM
LOCATION SIMPLE: ABDOMEN
LOCATION SIMPLE: LEFT FOREARM
LOCATION SIMPLE: UPPER BACK
LOCATION SIMPLE: LEFT CHEEK
LOCATION SIMPLE: RIGHT FOREARM
LOCATION SIMPLE: RIGHT THIGH
LOCATION SIMPLE: LEFT LOWER BACK
LOCATION SIMPLE: CHEST
LOCATION SIMPLE: LEFT THIGH

## 2023-10-16 ASSESSMENT — LOCATION ZONE DERM
LOCATION ZONE: ARM
LOCATION ZONE: LEG
LOCATION ZONE: FACE
LOCATION ZONE: TRUNK

## 2023-10-16 ASSESSMENT — LOCATION DETAILED DESCRIPTION DERM
LOCATION DETAILED: EPIGASTRIC SKIN
LOCATION DETAILED: INFERIOR THORACIC SPINE
LOCATION DETAILED: LEFT ANTERIOR DISTAL THIGH
LOCATION DETAILED: LEFT VENTRAL PROXIMAL FOREARM
LOCATION DETAILED: LEFT DISTAL DORSAL FOREARM
LOCATION DETAILED: LEFT INFERIOR CENTRAL MALAR CHEEK
LOCATION DETAILED: RIGHT ANTERIOR DISTAL THIGH
LOCATION DETAILED: LEFT INFERIOR LATERAL MIDBACK
LOCATION DETAILED: RIGHT PROXIMAL DORSAL FOREARM
LOCATION DETAILED: LEFT ANTERIOR PROXIMAL THIGH
LOCATION DETAILED: MIDDLE STERNUM
LOCATION DETAILED: RIGHT VENTRAL PROXIMAL FOREARM
LOCATION DETAILED: RIGHT VENTRAL DISTAL FOREARM
LOCATION DETAILED: LEFT VENTRAL DISTAL FOREARM
LOCATION DETAILED: RIGHT ANTERIOR PROXIMAL THIGH

## 2023-10-16 NOTE — PROCEDURE: COUNSELING
Detail Level: Zone
Detail Level: Detailed
Erythromycin Pregnancy And Lactation Text: This medication is Pregnancy Category B and is considered safe during pregnancy. It is also excreted in breast milk.
Birth Control Pills Counseling: Birth Control Pill Counseling: I discussed with the patient the potential side effects of OCPs including but not limited to increased risk of stroke, heart attack, thrombophlebitis, deep venous thrombosis, hepatic adenomas, breast changes, GI upset, headaches, and depression.  The patient verbalized understanding of the proper use and possible adverse effects of OCPs. All of the patient's questions and concerns were addressed.
Sarecycline Counseling: Patient advised regarding possible photosensitivity and discoloration of the teeth, skin, lips, tongue and gums.  Patient instructed to avoid sunlight, if possible.  When exposed to sunlight, patients should wear protective clothing, sunglasses, and sunscreen.  The patient was instructed to call the office immediately if the following severe adverse effects occur:  hearing changes, easy bruising/bleeding, severe headache, or vision changes.  The patient verbalized understanding of the proper use and possible adverse effects of sarecycline.  All of the patient's questions and concerns were addressed.
Spironolactone Counseling: Patient advised regarding risks of diarrhea, abdominal pain, hyperkalemia, birth defects (for female patients), liver toxicity and renal toxicity. The patient may need blood work to monitor liver and kidney function and potassium levels while on therapy. The patient verbalized understanding of the proper use and possible adverse effects of spironolactone.  All of the patient's questions and concerns were addressed.
Azelaic Acid Pregnancy And Lactation Text: This medication is considered safe during pregnancy and breast feeding.
Topical Clindamycin Counseling: Patient counseled that this medication may cause skin irritation or allergic reactions.  In the event of skin irritation, the patient was advised to reduce the amount of the drug applied or use it less frequently.   The patient verbalized understanding of the proper use and possible adverse effects of clindamycin.  All of the patient's questions and concerns were addressed.
Topical Sulfur Applications Counseling: Topical Sulfur Counseling: Patient counseled that this medication may cause skin irritation or allergic reactions.  In the event of skin irritation, the patient was advised to reduce the amount of the drug applied or use it less frequently.   The patient verbalized understanding of the proper use and possible adverse effects of topical sulfur application.  All of the patient's questions and concerns were addressed.
Isotretinoin Pregnancy And Lactation Text: This medication is Pregnancy Category X and is considered extremely dangerous during pregnancy. It is unknown if it is excreted in breast milk.
Dapsone Counseling: I discussed with the patient the risks of dapsone including but not limited to hemolytic anemia, agranulocytosis, rashes, methemoglobinemia, kidney failure, peripheral neuropathy, headaches, GI upset, and liver toxicity.  Patients who start dapsone require monitoring including baseline LFTs and weekly CBCs for the first month, then every month thereafter.  The patient verbalized understanding of the proper use and possible adverse effects of dapsone.  All of the patient's questions and concerns were addressed.
High Dose Vitamin A Pregnancy And Lactation Text: High dose vitamin A therapy is contraindicated during pregnancy and breast feeding.
Tetracycline Counseling: Patient counseled regarding possible photosensitivity and increased risk for sunburn.  Patient instructed to avoid sunlight, if possible.  When exposed to sunlight, patients should wear protective clothing, sunglasses, and sunscreen.  The patient was instructed to call the office immediately if the following severe adverse effects occur:  hearing changes, easy bruising/bleeding, severe headache, or vision changes.  The patient verbalized understanding of the proper use and possible adverse effects of tetracycline.  All of the patient's questions and concerns were addressed. Patient understands to avoid pregnancy while on therapy due to potential birth defects.
Benzoyl Peroxide Pregnancy And Lactation Text: This medication is Pregnancy Category C. It is unknown if benzoyl peroxide is excreted in breast milk.
Azithromycin Pregnancy And Lactation Text: This medication is considered safe during pregnancy and is also secreted in breast milk.
Doxycycline Counseling:  Patient counseled regarding possible photosensitivity and increased risk for sunburn.  Patient instructed to avoid sunlight, if possible.  When exposed to sunlight, patients should wear protective clothing, sunglasses, and sunscreen.  The patient was instructed to call the office immediately if the following severe adverse effects occur:  hearing changes, easy bruising/bleeding, severe headache, or vision changes.  The patient verbalized understanding of the proper use and possible adverse effects of doxycycline.  All of the patient's questions and concerns were addressed.
Winlevi Counseling:  I discussed with the patient the risks of topical clascoterone including but not limited to erythema, scaling, itching, and stinging. Patient voiced their understanding.
Topical Retinoid Pregnancy And Lactation Text: This medication is Pregnancy Category C. It is unknown if this medication is excreted in breast milk.
Erythromycin Counseling:  I discussed with the patient the risks of erythromycin including but not limited to GI upset, allergic reaction, drug rash, diarrhea, increase in liver enzymes, and yeast infections.
Minocycline Pregnancy And Lactation Text: This medication is Pregnancy Category D and not consider safe during pregnancy. It is also excreted in breast milk.
Aklief counseling:  Patient advised to apply a pea-sized amount only at bedtime and wait 30 minutes after washing their face before applying.  If too drying, patient may add a non-comedogenic moisturizer.  The most commonly reported side effects including irritation, redness, scaling, dryness, stinging, burning, itching, and increased risk of sunburn.  The patient verbalized understanding of the proper use and possible adverse effects of retinoids.  All of the patient's questions and concerns were addressed.
Tazorac Pregnancy And Lactation Text: This medication is not safe during pregnancy. It is unknown if this medication is excreted in breast milk.
Azelaic Acid Counseling: Patient counseled that medicine may cause skin irritation and to avoid applying near the eyes.  In the event of skin irritation, the patient was advised to reduce the amount of the drug applied or use it less frequently.   The patient verbalized understanding of the proper use and possible adverse effects of azelaic acid.  All of the patient's questions and concerns were addressed.
Bactrim Pregnancy And Lactation Text: This medication is Pregnancy Category D and is known to cause fetal risk.  It is also excreted in breast milk.
Birth Control Pills Pregnancy And Lactation Text: This medication should be avoided if pregnant and for the first 30 days post-partum.
Isotretinoin Counseling: Patient should get monthly blood tests, not donate blood, not drive at night if vision affected, not share medication, and not undergo elective surgery for 6 months after tx completed. Side effects reviewed, pt to contact office should one occur.
Topical Clindamycin Pregnancy And Lactation Text: This medication is Pregnancy Category B and is considered safe during pregnancy. It is unknown if it is excreted in breast milk.
Include Pregnancy/Lactation Warning?: No
High Dose Vitamin A Counseling: Side effects reviewed, pt to contact office should one occur.
Spironolactone Pregnancy And Lactation Text: This medication can cause feminization of the male fetus and should be avoided during pregnancy. The active metabolite is also found in breast milk.
Benzoyl Peroxide Counseling: Patient counseled that medicine may cause skin irritation and bleach clothing.  In the event of skin irritation, the patient was advised to reduce the amount of the drug applied or use it less frequently.   The patient verbalized understanding of the proper use and possible adverse effects of benzoyl peroxide.  All of the patient's questions and concerns were addressed.
Topical Sulfur Applications Pregnancy And Lactation Text: This medication is Pregnancy Category C and has an unknown safety profile during pregnancy. It is unknown if this topical medication is excreted in breast milk.
Topical Retinoid counseling:  Patient advised to apply a pea-sized amount only at bedtime and wait 30 minutes after washing their face before applying.  If too drying, patient may add a non-comedogenic moisturizer. The patient verbalized understanding of the proper use and possible adverse effects of retinoids.  All of the patient's questions and concerns were addressed.
Dapsone Pregnancy And Lactation Text: This medication is Pregnancy Category C and is not considered safe during pregnancy or breast feeding.
Azithromycin Counseling:  I discussed with the patient the risks of azithromycin including but not limited to GI upset, allergic reaction, drug rash, diarrhea, and yeast infections.
Doxycycline Pregnancy And Lactation Text: This medication is Pregnancy Category D and not consider safe during pregnancy. It is also excreted in breast milk but is considered safe for shorter treatment courses.
Minocycline Counseling: Patient advised regarding possible photosensitivity and discoloration of the teeth, skin, lips, tongue and gums.  Patient instructed to avoid sunlight, if possible.  When exposed to sunlight, patients should wear protective clothing, sunglasses, and sunscreen.  The patient was instructed to call the office immediately if the following severe adverse effects occur:  hearing changes, easy bruising/bleeding, severe headache, or vision changes.  The patient verbalized understanding of the proper use and possible adverse effects of minocycline.  All of the patient's questions and concerns were addressed.
Winlevi Pregnancy And Lactation Text: This medication is considered safe during pregnancy and breastfeeding.
Aklief Pregnancy And Lactation Text: It is unknown if this medication is safe to use during pregnancy.  It is unknown if this medication is excreted in breast milk.  Breastfeeding women should use the topical cream on the smallest area of the skin for the shortest time needed while breastfeeding.  Do not apply to nipple and areola.
Tazorac Counseling:  Patient advised that medication is irritating and drying.  Patient may need to apply sparingly and wash off after an hour before eventually leaving it on overnight.  The patient verbalized understanding of the proper use and possible adverse effects of tazorac.  All of the patient's questions and concerns were addressed.
Bactrim Counseling:  I discussed with the patient the risks of sulfa antibiotics including but not limited to GI upset, allergic reaction, drug rash, diarrhea, dizziness, photosensitivity, and yeast infections.  Rarely, more serious reactions can occur including but not limited to aplastic anemia, agranulocytosis, methemoglobinemia, blood dyscrasias, liver or kidney failure, lung infiltrates or desquamative/blistering drug rashes.
Cleanser Recommendations: Sulfa cleanser (prescription)

## 2023-10-16 NOTE — PROCEDURE: PRESCRIPTION MEDICATION MANAGEMENT
Plan: Patient is planning on getting pregnant so no tretinoin or spironolactone was prescribed today. Patient will try sulfa cleanse and if she wants clindamycin solution prescription sent she can call clinic.\\nRFs ok for 1 year, but return in 3 months if still persisting.
Detail Level: Zone
Render In Strict Bullet Format?: No
Initiate Treatment: Wash face with sulfa wash twice a day and let sit for 1-2 minutes before rinsing.

## 2023-10-18 ENCOUNTER — OFFICE VISIT (OUTPATIENT)
Dept: UROLOGY | Facility: CLINIC | Age: 28
End: 2023-10-18
Payer: COMMERCIAL

## 2023-10-18 ENCOUNTER — LAB (OUTPATIENT)
Dept: LAB | Facility: CLINIC | Age: 28
End: 2023-10-18
Payer: COMMERCIAL

## 2023-10-18 VITALS
DIASTOLIC BLOOD PRESSURE: 80 MMHG | BODY MASS INDEX: 21.47 KG/M2 | SYSTOLIC BLOOD PRESSURE: 122 MMHG | WEIGHT: 150 LBS | HEIGHT: 70 IN

## 2023-10-18 DIAGNOSIS — N39.0 RECURRENT UTI: Primary | ICD-10-CM

## 2023-10-18 DIAGNOSIS — N39.0 RECURRENT UTI: ICD-10-CM

## 2023-10-18 LAB
ALBUMIN UR-MCNC: NEGATIVE MG/DL
APPEARANCE UR: CLEAR
BILIRUB UR QL STRIP: NEGATIVE
COLOR UR AUTO: YELLOW
GLUCOSE UR STRIP-MCNC: NEGATIVE MG/DL
HGB UR QL STRIP: ABNORMAL
KETONES UR STRIP-MCNC: NEGATIVE MG/DL
LEUKOCYTE ESTERASE UR QL STRIP: NEGATIVE
NITRATE UR QL: NEGATIVE
PH UR STRIP: 7 [PH] (ref 5–7)
RBC #/AREA URNS AUTO: ABNORMAL /HPF
SP GR UR STRIP: 1.01 (ref 1–1.03)
SQUAMOUS #/AREA URNS AUTO: ABNORMAL /LPF
UROBILINOGEN UR STRIP-ACNC: 0.2 E.U./DL
WBC #/AREA URNS AUTO: ABNORMAL /HPF

## 2023-10-18 PROCEDURE — 99214 OFFICE O/P EST MOD 30 MIN: CPT | Performed by: OBSTETRICS & GYNECOLOGY

## 2023-10-18 PROCEDURE — 81001 URINALYSIS AUTO W/SCOPE: CPT

## 2023-10-18 RX ORDER — SULFACETAMIDE SODIUM, SULFUR 100; 50 MG/G; MG/G
EMULSION TOPICAL
COMMUNITY
Start: 2023-10-16 | End: 2023-10-18

## 2023-10-18 RX ORDER — PHENAZOPYRIDINE HYDROCHLORIDE 95 MG/1
190 TABLET ORAL 3 TIMES DAILY
COMMUNITY

## 2023-10-18 NOTE — PROGRESS NOTES
October 18, 2023    Referring Provider: No referring provider defined for this encounter.    Primary Care Provider: No Ref-Primary, Physician    CC: Recurrent UTIs    HPI:  Tiffany Munoz is a 28 year old female who presents for evaluation of her pelvic floor symptoms.  Tiffany says that she has had 5 UTIs in the past 2 years. Her symptoms are c/w a UTI with burning, frequency, foul smelling urine and occ l sided pelvic pain. She is asymptomatic today. She is treated with nitrofurantoin with resolution of her symptoms. Tiffany usually goes to urgent care when she has symptoms. There is a UA, UC from 9/14 that was no growth.    Tiffany says the UTIs are occ associated with intercourse. She is currently on Azo.    Tiffany is concerned about any association between bladder CA and UTIs as her aunt passed away from bladder cancer.      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    biopsy done 5/2021    Menorrhagia        Past Surgical History:   Procedure Laterality Date    BREAST BIOPSY, FNA RT/LT Left 05/2021    microflag placed    ORTHOPEDIC SURGERY Right     elbow       Social History     Socioeconomic History    Marital status: Single     Spouse name: Not on file    Number of children: Not on file    Years of education: Not on file    Highest education level: Not on file   Occupational History    Not on file   Tobacco Use    Smoking status: Never    Smokeless tobacco: Never   Vaping Use    Vaping Use: Some days    Substances: Nicotine    Devices: Disposable   Substance and Sexual Activity    Alcohol use: Yes     Alcohol/week: 6.0 standard drinks of alcohol     Types: 3 Glasses of wine, 3 Cans of beer per week     Comment: approx 6 drinks/week    Drug use: No    Sexual activity: Yes     Partners: Male     Birth control/protection: None, Natural Family Planning   Other Topics Concern    Parent/sibling w/ CABG, MI or angioplasty before 65F 55M? No   Social History Narrative  "   Not on file     Social Determinants of Health     Financial Resource Strain: Not on file   Food Insecurity: Not on file   Transportation Needs: Not on file   Physical Activity: Not on file   Stress: Not on file   Social Connections: Not on file   Interpersonal Safety: Not on file   Housing Stability: Not on file       Family History   Problem Relation Age of Onset    Skin Cancer Mother 55    Unknown/Adopted Father     No Known Problems Sister     No Known Problems Brother     Cerebrovascular Disease Maternal Grandmother 70    Breast Cancer Maternal Grandmother 40    No Known Problems Maternal Grandfather     Heart Failure Paternal Grandfather     Irregular heart beat Paternal Grandfather     No Known Problems Daughter     No Known Problems Son     No Known Problems Maternal Half-Brother     No Known Problems Maternal Half-Sister     No Known Problems Paternal Half-Brother     No Known Problems Paternal Half-Sister     No Known Problems Niece     No Known Problems Nephew     Thyroid Cancer Cousin 30    No Known Problems Other     Cancer No family hx of     Heart Disease No family hx of     Diabetes No family hx of     Hypertension No family hx of     Hyperlipidemia No family hx of     Colon Cancer No family hx of     Prostate Cancer No family hx of     Depression No family hx of     Anxiety Disorder No family hx of     Substance Abuse No family hx of     Anesthesia Reaction No family hx of     Asthma No family hx of     Osteoporosis No family hx of     Genetic Disorder No family hx of        ROS    Allergies   Allergen Reactions    Dust Mites     Molds & Smuts Unknown       Current Outpatient Medications   Medication    phenazopyridine (AZO URINARY PAIN RELIEF) 95 MG tablet     No current facility-administered medications for this visit.       /80   Ht 1.765 m (5' 9.5\")   Wt 68 kg (150 lb)   LMP 09/28/2023 (Approximate)   BMI 21.83 kg/m   Patient's last menstrual period was 09/28/2023 (approximate). Body " mass index is 21.83 kg/m .  Ms. Munoz is alert, comfortable in no acute distress, non-labored breathing.     A/P: Tiffany Munoz is a 28 year old F with recurrent UTIs    We discussed the diagnosis and treatment of UTIs. I explained to Tiffany that recurrent UTIs are based on 2 culture proven UTIs in 6 months or 3 in a year. We discussed her starting D-Mannose 500mg bid. I placed an order to a UA UC. F/U PRN    I spent a total of 30 minutes with  Ms. Munoz  on the date of the encounter in chart review, face to face patient visit, review of tests, documentation and/or discussion with other providers about the issues documented above.    Saqib Wynn MD  Professor, OB/GYN  Urogynecologist  CC  Patient Care Team:  No Ref-Primary, Physician as PCP - General  Yamila Mora MD as Assigned PCP  Mickie Coker APRN CNM as Assigned OBGYN Provider  Heidi Keene MD as Assigned Surgical Provider

## 2023-10-18 NOTE — PATIENT INSTRUCTIONS
Thank you for coming to see me today. I hope that I was able to answer your questions. Please do not hesitate to call if you have any further questions or concerns.     We discussed the possible cause of your symptoms and what to do to prevent a recurrent bladder infection.   Supplements to prevent UTI to consider  -Probiotics  -Cranberry (for these products let them know a doctor is recommending them)   Ellura: [email or web address]   Theracran HP by [employer] [facility] [id number]  -d-mannose 500mg twice a day  -Vitamin C 500-1000mg twice a day    I also placed an order to a urinalysis and culture in case your symptoms come back. You can go to any [facility] to get that test done.

## 2024-08-11 ENCOUNTER — HEALTH MAINTENANCE LETTER (OUTPATIENT)
Age: 29
End: 2024-08-11

## 2025-08-17 ENCOUNTER — HEALTH MAINTENANCE LETTER (OUTPATIENT)
Age: 30
End: 2025-08-17